# Patient Record
Sex: FEMALE | Race: WHITE | NOT HISPANIC OR LATINO | Employment: UNEMPLOYED | URBAN - METROPOLITAN AREA
[De-identification: names, ages, dates, MRNs, and addresses within clinical notes are randomized per-mention and may not be internally consistent; named-entity substitution may affect disease eponyms.]

---

## 2017-05-10 ENCOUNTER — ALLSCRIPTS OFFICE VISIT (OUTPATIENT)
Dept: OTHER | Facility: OTHER | Age: 62
End: 2017-05-10

## 2017-05-10 DIAGNOSIS — M54.2 CERVICALGIA: ICD-10-CM

## 2017-10-02 ENCOUNTER — ALLSCRIPTS OFFICE VISIT (OUTPATIENT)
Dept: OTHER | Facility: OTHER | Age: 62
End: 2017-10-02

## 2017-10-02 ENCOUNTER — APPOINTMENT (OUTPATIENT)
Dept: RADIOLOGY | Facility: MEDICAL CENTER | Age: 62
End: 2017-10-02
Payer: COMMERCIAL

## 2017-10-02 ENCOUNTER — TRANSCRIBE ORDERS (OUTPATIENT)
Dept: ADMINISTRATIVE | Facility: HOSPITAL | Age: 62
End: 2017-10-02

## 2017-10-02 DIAGNOSIS — M25.562 PAIN IN LEFT KNEE: ICD-10-CM

## 2017-10-02 PROCEDURE — 73562 X-RAY EXAM OF KNEE 3: CPT
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
## 2017-12-20 ENCOUNTER — GENERIC CONVERSION - ENCOUNTER (OUTPATIENT)
Dept: FAMILY MEDICINE CLINIC | Facility: CLINIC | Age: 62
End: 2017-12-20

## 2018-01-12 NOTE — RESULT NOTES
Message   Call patient  Patient's laboratory studies normal except elevated cholesterol  Recommend low-fat low-cholesterol diet and recheck lipid profile in 6 months     Verified Results  (1) COMPREHENSIVE METABOLIC PANEL 15VZQ6068 62:05AF Angel Roche     Test Name Result Flag Reference   GLUCOSE 90 mg/dL  65-99   Fasting reference interval   UREA NITROGEN (BUN) 20 mg/dL  7-25   CREATININE 0 98 mg/dL  0 50-0 99   For patients >52years of age, the reference limit  for Creatinine is approximately 13% higher for people  identified as -American  eGFR NON-AFR   AMERICAN 61 mL/min/1 73m2  > OR = 60   eGFR AFRICAN AMERICAN 71 mL/min/1 73m2  > OR = 60   BUN/CREATININE RATIO   3-14   NOT APPLICABLE (calc)   SODIUM 140 mmol/L  135-146   POTASSIUM 4 5 mmol/L  3 5-5 3   CHLORIDE 104 mmol/L     CARBON DIOXIDE 28 mmol/L  20-31   CALCIUM 9 4 mg/dL  8 6-10 4   PROTEIN, TOTAL 6 4 g/dL  6 1-8 1   ALBUMIN 4 1 g/dL  3 6-5 1   GLOBULIN 2 3 g/dL (calc)  1 9-3 7   ALBUMIN/GLOBULIN RATIO 1 8 (calc)  1 0-2 5   BILIRUBIN, TOTAL 0 6 mg/dL  0 2-1 2   ALKALINE PHOSPHATASE 94 U/L     AST 19 U/L  10-35   ALT 23 U/L  6-29     (1) CBC/PLT/DIFF 07Oct2016 08:27AM Angel Roche     Test Name Result Flag Reference   WHITE BLOOD CELL COUNT 6 4 Thousand/uL  3 8-10 8   RED BLOOD CELL COUNT 4 79 Million/uL  3 80-5 10   HEMOGLOBIN 14 3 g/dL  11 7-15 5   HEMATOCRIT 44 1 %  35 0-45 0   MCV 92 1 fL  80 0-100 0   MCH 29 9 pg  27 0-33 0   MCHC 32 5 g/dL  32 0-36 0   RDW 12 6 %  11 0-15 0   PLATELET COUNT 834 Thousand/uL  140-400   MPV 9 0 fL  7 5-11 5   ABSOLUTE NEUTROPHILS 4243 cells/uL  8565-0258   ABSOLUTE LYMPHOCYTES 1683 cells/uL  850-3900   ABSOLUTE MONOCYTES 314 cells/uL  200-950   ABSOLUTE EOSINOPHILS 141 cells/uL     ABSOLUTE BASOPHILS 19 cells/uL  0-200   NEUTROPHILS 66 3 %     LYMPHOCYTES 26 3 %     MONOCYTES 4 9 %     EOSINOPHILS 2 2 %     BASOPHILS 0 3 %       (Q) LIPID PANEL WITH REFLEX TO DIRECT LDL 07Oct2016 08: 27AM Frankey Dibble     Test Name Result Flag Reference   CHOLESTEROL, TOTAL 256 mg/dL H 125-200   HDL CHOLESTEROL 53 mg/dL  > OR = 46   TRIGLICERIDES 188 mg/dL  <150   LDL-CHOLESTEROL 180 mg/dL (calc) H <130   Desirable range <100 mg/dL for patients with CHD or  diabetes and <70 mg/dL for diabetic patients with  known heart disease  CHOL/HDLC RATIO 4 8 (calc)  < OR = 5 0   NON HDL CHOLESTEROL 203 mg/dL (calc) H    Target for non-HDL cholesterol is 30 mg/dL higher than   LDL cholesterol target       (Q) TSH, 3RD GENERATION W/REFLEX TO FT4 07Oct2016 08:27AM Frankey Dibble     Test Name Result Flag Reference   TSH W/REFLEX TO FT4 1 84 mIU/L  0 40-4 50

## 2018-01-14 VITALS
WEIGHT: 188.13 LBS | TEMPERATURE: 99.4 F | DIASTOLIC BLOOD PRESSURE: 80 MMHG | SYSTOLIC BLOOD PRESSURE: 130 MMHG | BODY MASS INDEX: 30.23 KG/M2 | HEIGHT: 66 IN

## 2018-01-15 VITALS
SYSTOLIC BLOOD PRESSURE: 120 MMHG | WEIGHT: 192 LBS | HEIGHT: 66 IN | BODY MASS INDEX: 30.86 KG/M2 | TEMPERATURE: 97.8 F | DIASTOLIC BLOOD PRESSURE: 80 MMHG

## 2018-11-13 ENCOUNTER — OFFICE VISIT (OUTPATIENT)
Dept: FAMILY MEDICINE CLINIC | Facility: CLINIC | Age: 63
End: 2018-11-13
Payer: COMMERCIAL

## 2018-11-13 VITALS
DIASTOLIC BLOOD PRESSURE: 82 MMHG | WEIGHT: 187 LBS | BODY MASS INDEX: 29.35 KG/M2 | SYSTOLIC BLOOD PRESSURE: 112 MMHG | HEIGHT: 67 IN

## 2018-11-13 DIAGNOSIS — E78.00 HYPERCHOLESTEROLEMIA: Primary | ICD-10-CM

## 2018-11-13 DIAGNOSIS — R73.01 IMPAIRED FASTING GLUCOSE: ICD-10-CM

## 2018-11-13 DIAGNOSIS — K21.9 GERD WITHOUT ESOPHAGITIS: ICD-10-CM

## 2018-11-13 DIAGNOSIS — L30.9 DERMATITIS: ICD-10-CM

## 2018-11-13 DIAGNOSIS — Z12.39 SCREENING FOR BREAST CANCER: ICD-10-CM

## 2018-11-13 DIAGNOSIS — Z12.11 SCREENING FOR COLON CANCER: ICD-10-CM

## 2018-11-13 DIAGNOSIS — R53.82 CHRONIC FATIGUE: ICD-10-CM

## 2018-11-13 PROCEDURE — 1101F PT FALLS ASSESS-DOCD LE1/YR: CPT | Performed by: FAMILY MEDICINE

## 2018-11-13 PROCEDURE — 90670 PCV13 VACCINE IM: CPT

## 2018-11-13 PROCEDURE — 3008F BODY MASS INDEX DOCD: CPT | Performed by: FAMILY MEDICINE

## 2018-11-13 PROCEDURE — 90471 IMMUNIZATION ADMIN: CPT

## 2018-11-13 PROCEDURE — 99214 OFFICE O/P EST MOD 30 MIN: CPT | Performed by: FAMILY MEDICINE

## 2018-11-13 RX ORDER — OMEPRAZOLE 20 MG/1
20 CAPSULE, DELAYED RELEASE ORAL DAILY
Qty: 90 CAPSULE | Refills: 1 | Status: SHIPPED | OUTPATIENT
Start: 2018-11-13 | End: 2019-03-27 | Stop reason: SDUPTHER

## 2018-11-13 NOTE — PROGRESS NOTES
Assessment/Plan:  Patient use betamethasone twice daily for dermatitis  Patient will modify diet regarding impaired fasting glucose  Patient use omeprazole for GERD  Patient will have laboratory studies regarding chronic fatigue  The patient will have mammogram   The patient already had flu shot for the year  The patient will have Prevnar 13 at this time  To consider statin or other lipid-lowering product in the near future  Will await laboratory studies  Follow-up in 4 months     Diagnoses and all orders for this visit:    Hypercholesterolemia  -     CBC and differential; Future  -     Comprehensive metabolic panel; Future  -     Hemoglobin A1C; Future  -     Lipid panel; Future  -     TSH, 3rd generation with Free T4 reflex; Future  -     Microalbumin / creatinine urine ratio  -     Vitamin B12; Future  -     C-reactive protein; Future  -     Cortisol Level, AM Specimen; Future  -     Lyme Antibody Profile with reflex to WB; Future  -     UA w Reflex to Microscopic w Reflex to Culture - Clinic Collect  -     Vitamin D 25 hydroxy; Future  -     Hepatitis C antibody; Future    Screening for colon cancer    Screening for breast cancer  -     Mammo screening bilateral w cad; Future  -     Mammo screening bilateral w 3d & cad; Future    Impaired fasting glucose  -     CBC and differential; Future  -     Comprehensive metabolic panel; Future  -     Hemoglobin A1C; Future  -     Lipid panel; Future  -     TSH, 3rd generation with Free T4 reflex; Future  -     Microalbumin / creatinine urine ratio  -     Vitamin B12; Future  -     C-reactive protein; Future  -     Cortisol Level, AM Specimen; Future  -     Lyme Antibody Profile with reflex to WB; Future  -     UA w Reflex to Microscopic w Reflex to Culture - Clinic Collect  -     Vitamin D 25 hydroxy; Future  -     Hepatitis C antibody;  Future  -     PNEUMOCOCCAL CONJUGATE VACCINE 13-VALENT GREATER THAN 6 MONTHS    Chronic fatigue  -     CBC and differential; Future  -     Comprehensive metabolic panel; Future  -     Hemoglobin A1C; Future  -     Lipid panel; Future  -     TSH, 3rd generation with Free T4 reflex; Future  -     Microalbumin / creatinine urine ratio  -     Vitamin B12; Future  -     C-reactive protein; Future  -     Cortisol Level, AM Specimen; Future  -     Lyme Antibody Profile with reflex to WB; Future  -     UA w Reflex to Microscopic w Reflex to Culture - Clinic Collect  -     Vitamin D 25 hydroxy; Future  -     Hepatitis C antibody; Future    GERD without esophagitis  -     omeprazole (PriLOSEC) 20 mg delayed release capsule; Take 1 capsule (20 mg total) by mouth daily    Dermatitis  -     betamethasone valerate (VALISONE) 0 1 % cream; Apply topically 2 (two) times a day    Other orders  -     Cancel: Ambulatory referral to Gastroenterology; Future  -     Cancel: Occult Blood, Fecal Immunochemical; Future  -     S-Adenosylmethionine (RODNEY-E) 200 MG TABS; Take by mouth  -     Multiple Vitamins-Minerals (MULTIVITAMIN GUMMIES ADULT PO); Take by mouth          Subjective:      Patient ID: Lynne Garcia is a 70 y o  female  Patient is here for multiple reasons 1 including fatigue over the past 4-5 months  Patient does exercise on a daily basis  Patient did go and have life screening test done  The patient states all screening normal other than cholesterol being elevated which was total of 328 with an HDL of 50 and an LDL of 254 triglycerides 119  Patient's glucose level was 101  Dietary intake roughly unchanged  Patient with family history of elevated cholesterol  Patient has tried multiple statins in the past without significant improvement and had side effects  Patient with ongoing acid reflux/GERD symptoms  Patient does use Prilosec  Patient with rash on the neck and arms  Patient did see Dermatology  Patient also needs screening mammo  Fatigue   Associated symptoms include fatigue and a rash     Arm Pain      Rash   Associated symptoms include fatigue  The following portions of the patient's history were reviewed and updated as appropriate: allergies, current medications, past family history, past medical history, past social history, past surgical history and problem list     Review of Systems   Constitutional: Positive for fatigue  HENT: Negative  Eyes: Negative  Respiratory: Negative  Cardiovascular: Negative  Gastrointestinal: Negative  Endocrine: Negative  Genitourinary: Negative  Musculoskeletal: Negative  Skin: Positive for rash  Allergic/Immunologic: Negative  Neurological: Negative  Hematological: Negative  Psychiatric/Behavioral: Negative  Objective:      /82 (BP Location: Right arm, Patient Position: Sitting, Cuff Size: Large)   Ht 5' 7" (1 702 m)   Wt 84 8 kg (187 lb)   BMI 29 29 kg/m²          Physical Exam   Constitutional: She is oriented to person, place, and time  She appears well-developed and well-nourished  No distress  HENT:   Head: Normocephalic  Right Ear: External ear normal    Left Ear: External ear normal    Mouth/Throat: Oropharynx is clear and moist  No oropharyngeal exudate  Eyes: Pupils are equal, round, and reactive to light  EOM are normal  Right eye exhibits no discharge  Left eye exhibits no discharge  No scleral icterus  Neck: Normal range of motion  Neck supple  No thyromegaly present  Cardiovascular: Normal rate, regular rhythm, normal heart sounds and intact distal pulses  Exam reveals no gallop and no friction rub  No murmur heard  Pulmonary/Chest: Effort normal and breath sounds normal  No respiratory distress  She has no wheezes  She has no rales  She exhibits no tenderness  Abdominal: Soft  Bowel sounds are normal  She exhibits no distension  There is no tenderness  There is no rebound and no guarding  Musculoskeletal: Normal range of motion  She exhibits no edema or tenderness     Lymphadenopathy:     She has no cervical adenopathy  Neurological: She is oriented to person, place, and time  No cranial nerve deficit  She exhibits normal muscle tone  Coordination normal    Skin: Skin is warm and dry  Rash noted  She is not diaphoretic  No erythema  No pallor  Erythematous rash on her neck   Psychiatric: She has a normal mood and affect  Her behavior is normal  Judgment and thought content normal    Nursing note and vitals reviewed

## 2018-11-15 ENCOUNTER — APPOINTMENT (OUTPATIENT)
Dept: LAB | Age: 63
End: 2018-11-15
Payer: COMMERCIAL

## 2018-11-15 DIAGNOSIS — R53.82 CHRONIC FATIGUE: ICD-10-CM

## 2018-11-15 DIAGNOSIS — R73.01 IMPAIRED FASTING GLUCOSE: ICD-10-CM

## 2018-11-15 DIAGNOSIS — E78.00 HYPERCHOLESTEROLEMIA: ICD-10-CM

## 2018-11-15 LAB
25(OH)D3 SERPL-MCNC: 41.7 NG/ML (ref 30–100)
ALBUMIN SERPL BCP-MCNC: 3.7 G/DL (ref 3.5–5)
ALP SERPL-CCNC: 108 U/L (ref 46–116)
ALT SERPL W P-5'-P-CCNC: 26 U/L (ref 12–78)
ANION GAP SERPL CALCULATED.3IONS-SCNC: 2 MMOL/L (ref 4–13)
AST SERPL W P-5'-P-CCNC: 17 U/L (ref 5–45)
BACTERIA UR QL AUTO: ABNORMAL /HPF
BASOPHILS # BLD AUTO: 0.05 THOUSANDS/ΜL (ref 0–0.1)
BASOPHILS NFR BLD AUTO: 1 % (ref 0–1)
BILIRUB SERPL-MCNC: 0.69 MG/DL (ref 0.2–1)
BILIRUB UR QL STRIP: NEGATIVE
BUN SERPL-MCNC: 19 MG/DL (ref 5–25)
CALCIUM SERPL-MCNC: 8.6 MG/DL (ref 8.3–10.1)
CHLORIDE SERPL-SCNC: 107 MMOL/L (ref 100–108)
CHOLEST SERPL-MCNC: 268 MG/DL (ref 50–200)
CLARITY UR: CLEAR
CO2 SERPL-SCNC: 29 MMOL/L (ref 21–32)
COLOR UR: YELLOW
CORTIS AM PEAK SERPL-MCNC: 12.6 UG/DL (ref 4.2–22.4)
CREAT SERPL-MCNC: 1.04 MG/DL (ref 0.6–1.3)
CREAT UR-MCNC: 134 MG/DL
CRP SERPL QL: <3 MG/L
EOSINOPHIL # BLD AUTO: 0.12 THOUSAND/ΜL (ref 0–0.61)
EOSINOPHIL NFR BLD AUTO: 2 % (ref 0–6)
ERYTHROCYTE [DISTWIDTH] IN BLOOD BY AUTOMATED COUNT: 12.4 % (ref 11.6–15.1)
EST. AVERAGE GLUCOSE BLD GHB EST-MCNC: 120 MG/DL
GFR SERPL CREATININE-BSD FRML MDRD: 57 ML/MIN/1.73SQ M
GLUCOSE P FAST SERPL-MCNC: 92 MG/DL (ref 65–99)
GLUCOSE UR STRIP-MCNC: NEGATIVE MG/DL
HBA1C MFR BLD: 5.8 % (ref 4.2–6.3)
HCT VFR BLD AUTO: 44.2 % (ref 34.8–46.1)
HCV AB SER QL: NORMAL
HDLC SERPL-MCNC: 51 MG/DL (ref 40–60)
HGB BLD-MCNC: 14.8 G/DL (ref 11.5–15.4)
HGB UR QL STRIP.AUTO: NEGATIVE
HYALINE CASTS #/AREA URNS LPF: ABNORMAL /LPF
IMM GRANULOCYTES # BLD AUTO: 0.01 THOUSAND/UL (ref 0–0.2)
IMM GRANULOCYTES NFR BLD AUTO: 0 % (ref 0–2)
KETONES UR STRIP-MCNC: NEGATIVE MG/DL
LDLC SERPL CALC-MCNC: 198 MG/DL (ref 0–100)
LEUKOCYTE ESTERASE UR QL STRIP: ABNORMAL
LYMPHOCYTES # BLD AUTO: 1.77 THOUSANDS/ΜL (ref 0.6–4.47)
LYMPHOCYTES NFR BLD AUTO: 29 % (ref 14–44)
MCH RBC QN AUTO: 30.5 PG (ref 26.8–34.3)
MCHC RBC AUTO-ENTMCNC: 33.5 G/DL (ref 31.4–37.4)
MCV RBC AUTO: 91 FL (ref 82–98)
MICROALBUMIN UR-MCNC: 7 MG/L (ref 0–20)
MICROALBUMIN/CREAT 24H UR: 5 MG/G CREATININE (ref 0–30)
MONOCYTES # BLD AUTO: 0.38 THOUSAND/ΜL (ref 0.17–1.22)
MONOCYTES NFR BLD AUTO: 6 % (ref 4–12)
NEUTROPHILS # BLD AUTO: 3.75 THOUSANDS/ΜL (ref 1.85–7.62)
NEUTS SEG NFR BLD AUTO: 62 % (ref 43–75)
NITRITE UR QL STRIP: NEGATIVE
NON-SQ EPI CELLS URNS QL MICRO: ABNORMAL /HPF
NONHDLC SERPL-MCNC: 217 MG/DL
NRBC BLD AUTO-RTO: 0 /100 WBCS
PH UR STRIP.AUTO: 6.5 [PH] (ref 4.5–8)
PLATELET # BLD AUTO: 236 THOUSANDS/UL (ref 149–390)
PMV BLD AUTO: 10.2 FL (ref 8.9–12.7)
POTASSIUM SERPL-SCNC: 4.3 MMOL/L (ref 3.5–5.3)
PROT SERPL-MCNC: 7.1 G/DL (ref 6.4–8.2)
PROT UR STRIP-MCNC: NEGATIVE MG/DL
RBC # BLD AUTO: 4.86 MILLION/UL (ref 3.81–5.12)
RBC #/AREA URNS AUTO: ABNORMAL /HPF
SODIUM SERPL-SCNC: 138 MMOL/L (ref 136–145)
SP GR UR STRIP.AUTO: 1.02 (ref 1–1.03)
TRIGL SERPL-MCNC: 94 MG/DL
TSH SERPL DL<=0.05 MIU/L-ACNC: 1.66 UIU/ML (ref 0.36–3.74)
UROBILINOGEN UR QL STRIP.AUTO: 0.2 E.U./DL
VIT B12 SERPL-MCNC: 894 PG/ML (ref 100–900)
WBC # BLD AUTO: 6.08 THOUSAND/UL (ref 4.31–10.16)
WBC #/AREA URNS AUTO: ABNORMAL /HPF

## 2018-11-15 PROCEDURE — 82043 UR ALBUMIN QUANTITATIVE: CPT | Performed by: FAMILY MEDICINE

## 2018-11-15 PROCEDURE — 82570 ASSAY OF URINE CREATININE: CPT | Performed by: FAMILY MEDICINE

## 2018-11-15 PROCEDURE — 80053 COMPREHEN METABOLIC PANEL: CPT

## 2018-11-15 PROCEDURE — 81001 URINALYSIS AUTO W/SCOPE: CPT | Performed by: FAMILY MEDICINE

## 2018-11-15 PROCEDURE — 82306 VITAMIN D 25 HYDROXY: CPT

## 2018-11-15 PROCEDURE — 85025 COMPLETE CBC W/AUTO DIFF WBC: CPT

## 2018-11-15 PROCEDURE — 83036 HEMOGLOBIN GLYCOSYLATED A1C: CPT

## 2018-11-15 PROCEDURE — 82607 VITAMIN B-12: CPT

## 2018-11-15 PROCEDURE — 80061 LIPID PANEL: CPT

## 2018-11-15 PROCEDURE — 36415 COLL VENOUS BLD VENIPUNCTURE: CPT

## 2018-11-15 PROCEDURE — 86140 C-REACTIVE PROTEIN: CPT

## 2018-11-15 PROCEDURE — 84443 ASSAY THYROID STIM HORMONE: CPT

## 2018-11-15 PROCEDURE — 86803 HEPATITIS C AB TEST: CPT

## 2018-11-15 PROCEDURE — 86618 LYME DISEASE ANTIBODY: CPT

## 2018-11-15 PROCEDURE — 82533 TOTAL CORTISOL: CPT

## 2018-11-16 DIAGNOSIS — E78.00 PURE HYPERCHOLESTEROLEMIA: Primary | ICD-10-CM

## 2018-11-16 NOTE — TELEPHONE ENCOUNTER
SPOKE TO PT   SHE HAS TRIED NUMEROUS CHOLESTEROL MEDS (CRESTOR, LIPITOR) DUE TO SIDE EFFECTS    SEND MED TO [facility]

## 2018-11-16 NOTE — TELEPHONE ENCOUNTER
Spoke with patient  She states she was already called and someone relayed the message to her already  She states she knew someone was going to send in the medication for her; however, I can not find who called her  Please approve the prepped script for the patient  Thank you

## 2018-11-16 NOTE — TELEPHONE ENCOUNTER
Call patient  Have patient try Livalo 2 mg daily Number 30 with 2 refills  Have patient take coenzyme Q10 200 mg daily over-the-counter  Tell patient to stop medication if severe joint pains or muscle pains start such as with previous statin use    Recheck CMP, lipid profile in 2 months if able to take medication

## 2018-11-16 NOTE — TELEPHONE ENCOUNTER
----- Message from Mahin Aguilar DO sent at 11/15/2018 12:19 PM EST -----  Call patient  Most of her blood work is normal other than elevated cholesterol with total cholesterol 268 and   Some of her labs are still pending  Recommend low-fat low-cholesterol diet    See if patient would be willing to consider statin medication

## 2018-11-17 LAB
B BURGDOR IGG SER IA-ACNC: 0.33
B BURGDOR IGM SER IA-ACNC: 0.16

## 2018-11-21 ENCOUNTER — TELEPHONE (OUTPATIENT)
Dept: FAMILY MEDICINE CLINIC | Facility: CLINIC | Age: 63
End: 2018-11-21

## 2018-11-21 NOTE — TELEPHONE ENCOUNTER
I left a message notifying the pharmacy of the medication approval for Livalo  It's valid from 9/21-18 - 12/31/2099

## 2019-03-27 ENCOUNTER — OFFICE VISIT (OUTPATIENT)
Dept: FAMILY MEDICINE CLINIC | Facility: CLINIC | Age: 64
End: 2019-03-27
Payer: COMMERCIAL

## 2019-03-27 VITALS
SYSTOLIC BLOOD PRESSURE: 118 MMHG | DIASTOLIC BLOOD PRESSURE: 72 MMHG | BODY MASS INDEX: 29.03 KG/M2 | TEMPERATURE: 98.1 F | HEIGHT: 67 IN | WEIGHT: 185 LBS

## 2019-03-27 DIAGNOSIS — E78.00 PURE HYPERCHOLESTEROLEMIA: ICD-10-CM

## 2019-03-27 DIAGNOSIS — Z00.00 WELL ADULT EXAM: Primary | ICD-10-CM

## 2019-03-27 DIAGNOSIS — K21.9 GERD WITHOUT ESOPHAGITIS: ICD-10-CM

## 2019-03-27 PROCEDURE — 1160F RVW MEDS BY RX/DR IN RCRD: CPT | Performed by: FAMILY MEDICINE

## 2019-03-27 PROCEDURE — 99397 PER PM REEVAL EST PAT 65+ YR: CPT | Performed by: FAMILY MEDICINE

## 2019-03-27 RX ORDER — OMEPRAZOLE 20 MG/1
20 CAPSULE, DELAYED RELEASE ORAL DAILY
Qty: 90 CAPSULE | Refills: 1 | Status: SHIPPED | OUTPATIENT
Start: 2019-03-27 | End: 2020-08-27 | Stop reason: SDUPTHER

## 2019-03-27 NOTE — PROGRESS NOTES
Assessment/Plan:  Patient will try to decrease and restart level low at 1 mg every 3 days and increased accordingly  To consider Zetia  Patient go for mammogram   Patient up-to-date with tetanus shot  Diagnoses and all orders for this visit:    Well adult exam  -     Mammo screening bilateral w cad; Future    Pure hypercholesterolemia  -     pitavastatin 1 MG TABS; Take 1 tablet (1 mg total) by mouth daily with dinner  -     Comprehensive metabolic panel; Future  -     Lipid panel; Future          Subjective:      Patient ID: Mariangel Rhoades is a 70 y o  female  Patient is here for wellness exam   Patient having a lot of GI issues associated with level 0  Patient stop medication roughly 3 weeks ago  Patient with some fatigue  No chest pain or shortness of breath  No headache or blurred vision  Patient does notice that the urinary stream is slightly slower  GI issues back to normal       The following portions of the patient's history were reviewed and updated as appropriate: allergies, current medications, past family history, past medical history, past social history, past surgical history and problem list     Review of Systems   Constitutional: Negative  HENT: Negative  Eyes: Negative  Respiratory: Negative  Cardiovascular: Negative  Gastrointestinal: Negative  Endocrine: Negative  Genitourinary: Negative  Musculoskeletal: Negative  Skin: Negative  Allergic/Immunologic: Negative  Neurological: Negative  Hematological: Negative  Psychiatric/Behavioral: Negative            Objective:      /72 (BP Location: Left arm)   Temp 98 1 °F (36 7 °C)   Ht 5' 7" (1 702 m)   Wt 83 9 kg (185 lb)   BMI 28 98 kg/m²          Physical Exam

## 2020-03-19 ENCOUNTER — TELEPHONE (OUTPATIENT)
Dept: FAMILY MEDICINE CLINIC | Facility: CLINIC | Age: 65
End: 2020-03-19

## 2020-03-19 PROCEDURE — G2012 BRIEF CHECK IN BY MD/QHP: HCPCS

## 2020-03-19 NOTE — TELEPHONE ENCOUNTER
COVID-19 Phone Call Triage    Humphrey Pacheco called stating that they are having Cough, Fatigue, Body Aches and Chills  Humphrey Pacheco has not traveled outside the [place]  within the last 14 days  Bhavna Espino has not been around any one ill or exposed to COVID-19  Please call patient to triage

## 2020-03-19 NOTE — TELEPHONE ENCOUNTER
COVID-19 Virtual Visit     This virtual check-in was done via telephone  Encounter provider Seth Wise    Provider located at [address] [address] [id number]    Recent Visits  No visits were found meeting these conditions  Showing recent visits within past 7 days and meeting all other requirements     Today's Visits  Date Type Provider Dept   03/19/20 Telephone 120[facility] today's visits and meeting all other requirements     Future Appointments  No visits were found meeting these conditions  Showing future appointments within next 150 days and meeting all other requirements        Patient agrees to participate in a virtual check in via telephone or video visit instead of presenting to the office to address urgent/immediate medical needs  Patient is aware this is a billable service  After connecting through telephone, the patient was identified by name and date of birth  Janes Perez was informed that this was a telemedicine visit and that the exam was being conducted confidentially over secure lines  My office door was closed  No one else was in the room  Janes Perez acknowledged consent and understanding of privacy and security of the telemedicine visit  I informed the patient that I have reviewed her record in Epic and presented the opportunity for her to ask any questions regarding the visit today  The patient agreed to participate  Janes Perez is a 75 y o  female who is concerned about COVID-19  She reports congestion sinus pressure and chills with postnasal drip  No fevers chills nausea vomiting or significant cough  She has not traveled outside the [place]  within the last 14 days    She has not had contact with a person who is under investigation for or who is positive for COVID-19 within the last 14 days   She has not been hospitalized recently for fever and/or lower respiratory symptoms  Past Medical History:   Diagnosis Date    Bell's palsy        Past Surgical History:   Procedure Laterality Date    LIPOSUCTION      Thighs       Current Outpatient Medications   Medication Sig Dispense Refill    betamethasone valerate (VALISONE) 0 1 % cream Apply topically 2 (two) times a day 30 g 0    Multiple Vitamins-Minerals (MULTIVITAMIN GUMMIES ADULT PO) Take by mouth      omeprazole (PriLOSEC) 20 mg delayed release capsule Take 1 capsule (20 mg total) by mouth daily 90 capsule 1    Pitavastatin Calcium 1 MG TABS Take 1 tablet (1 mg total) by mouth daily with dinner 90 tablet 1    S-Adenosylmethionine (RODNEY-E) 200 MG TABS Take by mouth       No current facility-administered medications for this visit  Allergies   Allergen Reactions    Bee Venom     Codeine Nausea Only and Vomiting    Other      Action Taken: SHRIMP  ANESTHESIA "DEATHLY ILL"  NARCOTICS;     Shrimp Extract Allergy Skin Test     Statins         Disposition:      After clarifying the patient's history, my suspicion for COVID-19 infection is very low  Patient has a script for amoxicillin which she plans to take and encouraged her to begin Flonase as well and call back if not better or worsened    I spent 5 minutes with the patient during this virtual check-in visit

## 2020-05-05 PROBLEM — J01.00 ACUTE NON-RECURRENT MAXILLARY SINUSITIS: Status: ACTIVE | Noted: 2020-05-05

## 2020-05-05 PROBLEM — H10.32 ACUTE BACTERIAL CONJUNCTIVITIS OF LEFT EYE: Status: ACTIVE | Noted: 2020-05-05

## 2020-08-27 ENCOUNTER — OFFICE VISIT (OUTPATIENT)
Dept: FAMILY MEDICINE CLINIC | Facility: CLINIC | Age: 65
End: 2020-08-27
Payer: MEDICARE

## 2020-08-27 VITALS
TEMPERATURE: 97.6 F | DIASTOLIC BLOOD PRESSURE: 68 MMHG | BODY MASS INDEX: 30.45 KG/M2 | SYSTOLIC BLOOD PRESSURE: 122 MMHG | WEIGHT: 194.4 LBS

## 2020-08-27 DIAGNOSIS — R73.01 IMPAIRED FASTING GLUCOSE: Primary | ICD-10-CM

## 2020-08-27 DIAGNOSIS — Z00.00 MEDICARE ANNUAL WELLNESS VISIT, SUBSEQUENT: ICD-10-CM

## 2020-08-27 DIAGNOSIS — Z13.6 SCREENING FOR CARDIOVASCULAR CONDITION: ICD-10-CM

## 2020-08-27 DIAGNOSIS — Z23 ENCOUNTER FOR IMMUNIZATION: ICD-10-CM

## 2020-08-27 DIAGNOSIS — Z13.1 SCREENING FOR DIABETES MELLITUS: ICD-10-CM

## 2020-08-27 DIAGNOSIS — K21.9 GERD WITHOUT ESOPHAGITIS: ICD-10-CM

## 2020-08-27 DIAGNOSIS — H10.32 ACUTE BACTERIAL CONJUNCTIVITIS OF LEFT EYE: ICD-10-CM

## 2020-08-27 DIAGNOSIS — Z12.31 ENCOUNTER FOR SCREENING MAMMOGRAM FOR BREAST CANCER: ICD-10-CM

## 2020-08-27 PROCEDURE — 90732 PPSV23 VACC 2 YRS+ SUBQ/IM: CPT | Performed by: FAMILY MEDICINE

## 2020-08-27 PROCEDURE — 1160F RVW MEDS BY RX/DR IN RCRD: CPT | Performed by: FAMILY MEDICINE

## 2020-08-27 PROCEDURE — G0009 ADMIN PNEUMOCOCCAL VACCINE: HCPCS | Performed by: FAMILY MEDICINE

## 2020-08-27 PROCEDURE — 1125F AMNT PAIN NOTED PAIN PRSNT: CPT | Performed by: FAMILY MEDICINE

## 2020-08-27 PROCEDURE — 1036F TOBACCO NON-USER: CPT | Performed by: FAMILY MEDICINE

## 2020-08-27 PROCEDURE — G0402 INITIAL PREVENTIVE EXAM: HCPCS | Performed by: FAMILY MEDICINE

## 2020-08-27 PROCEDURE — 4040F PNEUMOC VAC/ADMIN/RCVD: CPT | Performed by: FAMILY MEDICINE

## 2020-08-27 PROCEDURE — 1170F FXNL STATUS ASSESSED: CPT | Performed by: FAMILY MEDICINE

## 2020-08-27 RX ORDER — ASPIRIN 81 MG/1
81 TABLET, CHEWABLE ORAL
COMMUNITY

## 2020-08-27 RX ORDER — OMEPRAZOLE 20 MG/1
20 CAPSULE, DELAYED RELEASE ORAL DAILY
Qty: 90 CAPSULE | Refills: 1 | Status: SHIPPED | OUTPATIENT
Start: 2020-08-27 | End: 2021-01-06 | Stop reason: SDUPTHER

## 2020-08-27 RX ORDER — OFLOXACIN 3 MG/ML
1 SOLUTION/ DROPS OPHTHALMIC 3 TIMES DAILY
Qty: 5 ML | Refills: 0 | Status: SHIPPED | OUTPATIENT
Start: 2020-08-27 | End: 2021-10-26

## 2020-08-27 NOTE — PATIENT INSTRUCTIONS
Medicare Preventive Visit Patient Instructions  Thank you for completing your Welcome to Medicare Visit or Medicare Annual Wellness Visit today  Your next wellness visit will be due in one year (8/27/2021)  The screening/preventive services that you may require over the next 5-10 years are detailed below  Some tests may not apply to you based off risk factors and/or age  Screening tests ordered at today's visit but not completed yet may show as past due  Also, please note that scanned in results may not display below  Preventive Screenings:  Service Recommendations Previous Testing/Comments   Colorectal Cancer Screening  * Colonoscopy    * Fecal Occult Blood Test (FOBT)/Fecal Immunochemical Test (FIT)  * Fecal DNA/Cologuard Test  * Flexible Sigmoidoscopy Age: 54-65 years old   Colonoscopy: every 10 years (may be performed more frequently if at higher risk)  OR  FOBT/FIT: every 1 year  OR  Cologuard: every 3 years  OR  Sigmoidoscopy: every 5 years  Screening may be recommended earlier than age 48 if at higher risk for colorectal cancer  Also, an individualized decision between you and your healthcare provider will decide whether screening between the ages of 74-80 would be appropriate  Colonoscopy: Not on file  FOBT/FIT: Not on file  Cologuard: Not on file  Sigmoidoscopy: Not on file    Risks and Benefits Discussed  Screening Current     Breast Cancer Screening Age: 36 years old  Frequency: every 1-2 years  Not required if history of left and right mastectomy Mammogram: Not on file    Risks and Benefits Discussed  Screening Current   Cervical Cancer Screening Between the ages of 21-29, pap smear recommended once every 3 years  Between the ages of 33-67, can perform pap smear with HPV co-testing every 5 years     Recommendations may differ for women with a history of total hysterectomy, cervical cancer, or abnormal pap smears in past  Pap Smear: Not on file    Screening Not Indicated  Risks and Benefits Discussed   Hepatitis C Screening Once for adults born between 1945 and 1965  More frequently in patients at high risk for Hepatitis C Hep C Antibody: 11/15/2018    Screening Current  Risks and Benefits Discussed   Diabetes Screening 1-2 times per year if you're at risk for diabetes or have pre-diabetes Fasting glucose: 92 mg/dL   A1C: 5 8 %    Risks and Benefits Discussed  Screening Current   Cholesterol Screening Once every 5 years if you don't have a lipid disorder  May order more often based on risk factors  Lipid panel: 11/15/2018    Screening Not Indicated  History Lipid Disorder  Screening Current  Risks and Benefits Discussed     Other Preventive Screenings Covered by Medicare:  1  Abdominal Aortic Aneurysm (AAA) Screening: covered once if your at risk  You're considered to be at risk if you have a family history of AAA  2  Lung Cancer Screening: covers low dose CT scan once per year if you meet all of the following conditions: (1) Age 50-69; (2) No signs or symptoms of lung cancer; (3) Current smoker or have quit smoking within the last 15 years; (4) You have a tobacco smoking history of at least 30 pack years (packs per day multiplied by number of years you smoked); (5) You get a written order from a healthcare provider  3  Glaucoma Screening: covered annually if you're considered high risk: (1) You have diabetes OR (2) Family history of glaucoma OR (3)  aged 48 and older OR (3)  American aged 72 and older  3  Osteoporosis Screening: covered every 2 years if you meet one of the following conditions: (1) You're estrogen deficient and at risk for osteoporosis based off medical history and other findings; (2) Have a vertebral abnormality; (3) On glucocorticoid therapy for more than 3 months; (4) Have primary hyperparathyroidism; (5) On osteoporosis medications and need to assess response to drug therapy  · Last bone density test (DXA Scan): Not on file    5  HIV Screening: covered annually if you're between the age of 12-76  Also covered annually if you are younger than 13 and older than 72 with risk factors for HIV infection  For pregnant patients, it is covered up to 3 times per pregnancy  Immunizations:  Immunization Recommendations   Influenza Vaccine Annual influenza vaccination during flu season is recommended for all persons aged >= 6 months who do not have contraindications   Pneumococcal Vaccine (Prevnar and Pneumovax)  * Prevnar = PCV13  * Pneumovax = PPSV23   Adults 25-60 years old: 1-3 doses may be recommended based on certain risk factors  Adults 72 years old: Prevnar (PCV13) vaccine recommended followed by Pneumovax (PPSV23) vaccine  If already received PPSV23 since turning 65, then PCV13 recommended at least one year after PPSV23 dose  Hepatitis B Vaccine 3 dose series if at intermediate or high risk (ex: diabetes, end stage renal disease, liver disease)   Tetanus (Td) Vaccine - COST NOT COVERED BY MEDICARE PART B Following completion of primary series, a booster dose should be given every 10 years to maintain immunity against tetanus  Td may also be given as tetanus wound prophylaxis  Tdap Vaccine - COST NOT COVERED BY MEDICARE PART B Recommended at least once for all adults  For pregnant patients, recommended with each pregnancy  Shingles Vaccine (Shingrix) - COST NOT COVERED BY MEDICARE PART B  2 shot series recommended in those aged 48 and above     Health Maintenance Due:      Topic Date Due    Hepatitis C Screening  Completed     Immunizations Due:      Topic Date Due    DTaP,Tdap,and Td Vaccines (1 - Tdap) 08/04/1974    Pneumococcal Vaccine: 65+ Years (2 of 2 - PPSV23) 11/13/2019    Influenza Vaccine  07/01/2020     Advance Directives   What are advance directives? Advance directives are legal documents that state your wishes and plans for medical care  These plans are made ahead of time in case you lose your ability to make decisions for yourself   Advance directives can apply to any medical decision, such as the treatments you want, and if you want to donate organs  What are the types of advance directives? There are many types of advance directives, and each state has rules about how to use them  You may choose a combination of any of the following:  · Living will: This is a written record of the treatment you want  You can also choose which treatments you do not want, which to limit, and which to stop at a certain time  This includes surgery, medicine, IV fluid, and tube feedings  · Durable power of  for healthcare [facility]): This is a written record that states who you want to make healthcare choices for you when you are unable to make them for yourself  This person, called a proxy, is usually a family member or a friend  You may choose more than 1 proxy  · Do not resuscitate (DNR) order:  A DNR order is used in case your heart stops beating or you stop breathing  It is a request not to have certain forms of treatment, such as CPR  A DNR order may be included in other types of advance directives  · Medical directive: This covers the care that you want if you are in a coma, near death, or unable to make decisions for yourself  You can list the treatments you want for each condition  Treatment may include pain medicine, surgery, blood transfusions, dialysis, IV or tube feedings, and a ventilator (breathing machine)  · Values history: This document has questions about your views, beliefs, and how you feel and think about life  This information can help others choose the care that you would choose  Why are advance directives important? An advance directive helps you control your care  Although spoken wishes may be used, it is better to have your wishes written down  Spoken wishes can be misunderstood, or not followed  Treatments may be given even if you do not want them   An advance directive may make it easier for your family to make difficult choices about your care  © Copyright [employer] 2018 Information is for End User's use only and may not be sold, redistributed or otherwise used for commercial purposes   All illustrations and images included in CareNotes® are the copyrighted property of A D A M , Inc  or [address] [place], [place] [address]adriane eleanor

## 2020-08-27 NOTE — PROGRESS NOTES
Assessment and Plan:     Problem List Items Addressed This Visit     None           Preventive health issues were discussed with patient, and age appropriate screening tests were ordered as noted in patient's After Visit Summary  Personalized health advice and appropriate referrals for health education or preventive services given if needed, as noted in patient's After Visit Summary  History of Present Illness:     Patient presents for Medicare Annual Wellness visit    Patient Care Team:  Bryant Dwyer DO as PCP - General     Problem List:     Patient Active Problem List   Diagnosis    Hypercholesterolemia    GERD without esophagitis    Impaired fasting glucose    Chronic fatigue    Dermatitis    Well adult exam    Acute non-recurrent maxillary sinusitis    Acute bacterial conjunctivitis of left eye      Past Medical and Surgical History:     Past Medical History:   Diagnosis Date    Bell's palsy      Past Surgical History:   Procedure Laterality Date    LIPOSUCTION      Thighs      Family History:     No family history on file  Social History:        Social History     Socioeconomic History    Marital status: /Civil Union     Spouse name: Not on file    Number of children: Not on file    Years of education: Not on file    Highest education level: Not on file   Occupational History    Not on file   Social Needs    Financial resource strain: Not on file    Food insecurity     Worry: Not on file     Inability: Not on file   Spokeable needs     Medical: Not on file     Non-medical: Not on file   Tobacco Use    Smoking status: Former Smoker    Smokeless tobacco: Never Used   Substance and Sexual Activity    Alcohol use:  Yes    Drug use: No    Sexual activity: Not on file   Lifestyle    Physical activity     Days per week: Not on file     Minutes per session: Not on file    Stress: Not on file   Relationships    Social connections     Talks on phone: Not on file     Gets together: Not on file     Attends Sabianism service: Not on file     Active member of club or organization: Not on file     Attends meetings of clubs or organizations: Not on file     Relationship status: Not on file    Intimate partner violence     Fear of current or ex partner: Not on file     Emotionally abused: Not on file     Physically abused: Not on file     Forced sexual activity: Not on file   Other Topics Concern    Not on file   Social History Narrative    Always uses helmet    Always uses seatbelt     Caffeine use     Exercises regularly     Guns in the home: stored in locked cabinet     Pt has living will      Medications and Allergies:     Current Outpatient Medications   Medication Sig Dispense Refill    betamethasone valerate (VALISONE) 0 1 % cream Apply topically 2 (two) times a day 30 g 0    Multiple Vitamins-Minerals (MULTIVITAMIN GUMMIES ADULT PO) Take by mouth      ofloxacin (OCUFLOX) 0 3 % ophthalmic solution Administer 1 drop into the left eye 3 (three) times a day 5 mL 0    omeprazole (PriLOSEC) 20 mg delayed release capsule Take 1 capsule (20 mg total) by mouth daily 90 capsule 1    Pitavastatin Calcium 1 MG TABS Take 1 tablet (1 mg total) by mouth daily with dinner 90 tablet 1    S-Adenosylmethionine (RODNEY-E) 200 MG TABS Take by mouth       No current facility-administered medications for this visit        Allergies   Allergen Reactions    Bee Venom     Codeine Nausea Only and Vomiting    Other      Action Taken: SHRIMP  ANESTHESIA "DEATHLY ILL"  NARCOTICS;     Shrimp Extract Allergy Skin Test     Statins       Immunizations:     Immunization History   Administered Date(s) Administered    INFLUENZA 12/03/2012, 11/19/2013, 10/15/2014, 11/21/2016, 11/02/2018    Influenza Quadrivalent Preservative Free 3 years and older IM 10/23/2017    Pneumococcal Conjugate 13-Valent 11/13/2018    influenza, trivalent, adjuvanted 11/04/2019      Health Maintenance:         Topic Date Due    Hepatitis C Screening  Completed         Topic Date Due    DTaP,Tdap,and Td Vaccines (1 - Tdap) 08/04/1974    Pneumococcal Vaccine: 65+ Years (2 of 2 - PPSV23) 11/13/2019    Influenza Vaccine  07/01/2020      Medicare Health Risk Assessment:     There were no vitals taken for this visit  Mihir Garcia is here for her Subsequent Wellness visit  Health Risk Assessment:   Patient rates overall health as very good  Patient feels that their physical health rating is slightly worse  Eyesight was rated as same  Hearing was rated as same  Patient feels that their emotional and mental health rating is same  Pain experienced in the last 7 days has been some  Patient's pain rating has been 1/10  Patient states that she has experienced weight loss or gain in last 6 months  Depression Screening:   PHQ-2 Score: 0      Fall Risk Screening: In the past year, patient has experienced: no history of falling in past year      Urinary Incontinence Screening:   Patient has not leaked urine accidently in the last six months  Home Safety:  Patient does not have trouble with stairs inside or outside of their home  Patient has working smoke alarms and has working carbon monoxide detector  Home safety hazards include: none  Nutrition:   Current diet is Regular  Medications:   Patient is currently taking over-the-counter supplements  OTC medications include: see medication list  Patient is able to manage medications  Activities of Daily Living (ADLs)/Instrumental Activities of Daily Living (IADLs):   Walk and transfer into and out of bed and chair?: Yes  Dress and groom yourself?: Yes    Bathe or shower yourself?: Yes    Feed yourself?  Yes  Do your laundry/housekeeping?: Yes  Manage your money, pay your bills and track your expenses?: Yes  Make your own meals?: Yes    Do your own shopping?: Yes    Previous Hospitalizations:   Any hospitalizations or ED visits within the last 12 months?: No      Advance Care Planning: Living will: Yes    Durable POA for healthcare: Yes    Advanced directive: Yes      Cognitive Screening:   Provider or family/friend/caregiver concerned regarding cognition?: No    PREVENTIVE SCREENINGS      Cardiovascular Screening:    General: Screening Not Indicated, History Lipid Disorder and Risks and Benefits Discussed    Due for: Lipid Panel      Diabetes Screening:     General: Risks and Benefits Discussed    Due for: Blood Glucose      Colorectal Cancer Screening:     General: Risks and Benefits Discussed and Screening Current      Breast Cancer Screening:     General: Risks and Benefits Discussed and Screening Current    Due for: Mammogram        Cervical Cancer Screening:    General: Screening Not Indicated and Risks and Benefits Discussed      Osteoporosis Screening:    General: Risks and Benefits Discussed      Abdominal Aortic Aneurysm (AAA) Screening:        General: Risks and Benefits Discussed      Lung Cancer Screening:     General: Screening Not Indicated and Risks and Benefits Discussed      Hepatitis C Screening:    General: Screening Current and Risks and Benefits Discussed    Other Counseling Topics:   Calcium and vitamin D intake and regular weightbearing exercise         Larisa Irby DO

## 2020-11-03 ENCOUNTER — LAB (OUTPATIENT)
Dept: LAB | Age: 65
End: 2020-11-03
Payer: MEDICARE

## 2020-11-03 DIAGNOSIS — Z13.6 SCREENING FOR CARDIOVASCULAR CONDITION: ICD-10-CM

## 2020-11-03 DIAGNOSIS — Z00.00 MEDICARE ANNUAL WELLNESS VISIT, SUBSEQUENT: ICD-10-CM

## 2020-11-03 DIAGNOSIS — Z13.1 SCREENING FOR DIABETES MELLITUS: ICD-10-CM

## 2020-11-03 DIAGNOSIS — R73.01 IMPAIRED FASTING GLUCOSE: ICD-10-CM

## 2020-11-03 LAB
ALBUMIN SERPL BCP-MCNC: 4.3 G/DL (ref 3.5–5)
ALP SERPL-CCNC: 111 U/L (ref 46–116)
ALT SERPL W P-5'-P-CCNC: 26 U/L (ref 12–78)
ANION GAP SERPL CALCULATED.3IONS-SCNC: 4 MMOL/L (ref 4–13)
AST SERPL W P-5'-P-CCNC: 13 U/L (ref 5–45)
BASOPHILS # BLD AUTO: 0.03 THOUSANDS/ΜL (ref 0–0.1)
BASOPHILS NFR BLD AUTO: 0 % (ref 0–1)
BILIRUB SERPL-MCNC: 0.57 MG/DL (ref 0.2–1)
BUN SERPL-MCNC: 13 MG/DL (ref 5–25)
CALCIUM SERPL-MCNC: 9 MG/DL (ref 8.3–10.1)
CHLORIDE SERPL-SCNC: 107 MMOL/L (ref 100–108)
CHOLEST SERPL-MCNC: 276 MG/DL (ref 50–200)
CO2 SERPL-SCNC: 31 MMOL/L (ref 21–32)
CREAT SERPL-MCNC: 1.01 MG/DL (ref 0.6–1.3)
EOSINOPHIL # BLD AUTO: 0.08 THOUSAND/ΜL (ref 0–0.61)
EOSINOPHIL NFR BLD AUTO: 1 % (ref 0–6)
ERYTHROCYTE [DISTWIDTH] IN BLOOD BY AUTOMATED COUNT: 12.5 % (ref 11.6–15.1)
EST. AVERAGE GLUCOSE BLD GHB EST-MCNC: 117 MG/DL
GFR SERPL CREATININE-BSD FRML MDRD: 58 ML/MIN/1.73SQ M
GLUCOSE P FAST SERPL-MCNC: 92 MG/DL (ref 65–99)
HBA1C MFR BLD: 5.7 %
HCT VFR BLD AUTO: 48.2 % (ref 34.8–46.1)
HDLC SERPL-MCNC: 53 MG/DL
HGB BLD-MCNC: 15.6 G/DL (ref 11.5–15.4)
IMM GRANULOCYTES # BLD AUTO: 0.02 THOUSAND/UL (ref 0–0.2)
IMM GRANULOCYTES NFR BLD AUTO: 0 % (ref 0–2)
LDLC SERPL CALC-MCNC: 194 MG/DL (ref 0–100)
LYMPHOCYTES # BLD AUTO: 2.11 THOUSANDS/ΜL (ref 0.6–4.47)
LYMPHOCYTES NFR BLD AUTO: 28 % (ref 14–44)
MCH RBC QN AUTO: 29.8 PG (ref 26.8–34.3)
MCHC RBC AUTO-ENTMCNC: 32.4 G/DL (ref 31.4–37.4)
MCV RBC AUTO: 92 FL (ref 82–98)
MONOCYTES # BLD AUTO: 0.39 THOUSAND/ΜL (ref 0.17–1.22)
MONOCYTES NFR BLD AUTO: 5 % (ref 4–12)
NEUTROPHILS # BLD AUTO: 4.96 THOUSANDS/ΜL (ref 1.85–7.62)
NEUTS SEG NFR BLD AUTO: 66 % (ref 43–75)
NONHDLC SERPL-MCNC: 223 MG/DL
NRBC BLD AUTO-RTO: 0 /100 WBCS
PLATELET # BLD AUTO: 258 THOUSANDS/UL (ref 149–390)
PMV BLD AUTO: 10.2 FL (ref 8.9–12.7)
POTASSIUM SERPL-SCNC: 4 MMOL/L (ref 3.5–5.3)
PROT SERPL-MCNC: 7.5 G/DL (ref 6.4–8.2)
RBC # BLD AUTO: 5.23 MILLION/UL (ref 3.81–5.12)
SODIUM SERPL-SCNC: 142 MMOL/L (ref 136–145)
TRIGL SERPL-MCNC: 146 MG/DL
TSH SERPL DL<=0.05 MIU/L-ACNC: 1.19 UIU/ML (ref 0.36–3.74)
WBC # BLD AUTO: 7.59 THOUSAND/UL (ref 4.31–10.16)

## 2020-11-03 PROCEDURE — 80053 COMPREHEN METABOLIC PANEL: CPT

## 2020-11-03 PROCEDURE — 85025 COMPLETE CBC W/AUTO DIFF WBC: CPT

## 2020-11-03 PROCEDURE — 36415 COLL VENOUS BLD VENIPUNCTURE: CPT

## 2020-11-03 PROCEDURE — 80061 LIPID PANEL: CPT

## 2020-11-03 PROCEDURE — 83036 HEMOGLOBIN GLYCOSYLATED A1C: CPT

## 2020-11-03 PROCEDURE — 84443 ASSAY THYROID STIM HORMONE: CPT

## 2020-11-17 ENCOUNTER — OFFICE VISIT (OUTPATIENT)
Dept: FAMILY MEDICINE CLINIC | Facility: CLINIC | Age: 65
End: 2020-11-17
Payer: MEDICARE

## 2020-11-17 VITALS
WEIGHT: 190 LBS | SYSTOLIC BLOOD PRESSURE: 120 MMHG | TEMPERATURE: 97.1 F | DIASTOLIC BLOOD PRESSURE: 90 MMHG | BODY MASS INDEX: 29.82 KG/M2 | HEIGHT: 67 IN

## 2020-11-17 DIAGNOSIS — R20.2 PARESTHESIAS: Primary | ICD-10-CM

## 2020-11-17 DIAGNOSIS — R73.01 IMPAIRED FASTING GLUCOSE: ICD-10-CM

## 2020-11-17 PROCEDURE — 99214 OFFICE O/P EST MOD 30 MIN: CPT | Performed by: FAMILY MEDICINE

## 2020-11-17 RX ORDER — A/SINGAPORE/GP1908/2015 IVR-180 (AN A/MICHIGAN/45/2015 (H1N1)PDM09-LIKE VIRUS, A/HONG KONG/4801/2014, NYMC X-263B (H3N2) (AN A/HONG KONG/4801/2014-LIKE VIRUS), AND B/BRISBANE/60/2008, WILD TYPE (A B/BRISBANE/60/2008-LIKE VIRUS) 15; 15; 15 UG/.5ML; UG/.5ML; UG/.5ML
INJECTION, SUSPENSION INTRAMUSCULAR
COMMUNITY
Start: 2020-10-07 | End: 2021-10-26

## 2020-11-18 ENCOUNTER — LAB (OUTPATIENT)
Dept: LAB | Age: 65
End: 2020-11-18
Payer: MEDICARE

## 2020-11-18 DIAGNOSIS — R20.2 PARESTHESIAS: ICD-10-CM

## 2020-11-18 DIAGNOSIS — R73.01 IMPAIRED FASTING GLUCOSE: ICD-10-CM

## 2020-11-18 LAB
ALBUMIN SERPL BCP-MCNC: 4.1 G/DL (ref 3.5–5)
ALP SERPL-CCNC: 103 U/L (ref 46–116)
ALT SERPL W P-5'-P-CCNC: 24 U/L (ref 12–78)
ANION GAP SERPL CALCULATED.3IONS-SCNC: 4 MMOL/L (ref 4–13)
AST SERPL W P-5'-P-CCNC: 13 U/L (ref 5–45)
BASOPHILS # BLD AUTO: 0.06 THOUSANDS/ΜL (ref 0–0.1)
BASOPHILS NFR BLD AUTO: 1 % (ref 0–1)
BILIRUB SERPL-MCNC: 0.55 MG/DL (ref 0.2–1)
BUN SERPL-MCNC: 20 MG/DL (ref 5–25)
CALCIUM SERPL-MCNC: 9.4 MG/DL (ref 8.3–10.1)
CHLORIDE SERPL-SCNC: 107 MMOL/L (ref 100–108)
CO2 SERPL-SCNC: 30 MMOL/L (ref 21–32)
CREAT SERPL-MCNC: 1.05 MG/DL (ref 0.6–1.3)
CRP SERPL QL: <3 MG/L
EOSINOPHIL # BLD AUTO: 0.08 THOUSAND/ΜL (ref 0–0.61)
EOSINOPHIL NFR BLD AUTO: 1 % (ref 0–6)
ERYTHROCYTE [DISTWIDTH] IN BLOOD BY AUTOMATED COUNT: 12.6 % (ref 11.6–15.1)
GFR SERPL CREATININE-BSD FRML MDRD: 55 ML/MIN/1.73SQ M
GLUCOSE P FAST SERPL-MCNC: 101 MG/DL (ref 65–99)
HCT VFR BLD AUTO: 47.4 % (ref 34.8–46.1)
HGB BLD-MCNC: 15.4 G/DL (ref 11.5–15.4)
IMM GRANULOCYTES # BLD AUTO: 0.01 THOUSAND/UL (ref 0–0.2)
IMM GRANULOCYTES NFR BLD AUTO: 0 % (ref 0–2)
LYMPHOCYTES # BLD AUTO: 1.6 THOUSANDS/ΜL (ref 0.6–4.47)
LYMPHOCYTES NFR BLD AUTO: 21 % (ref 14–44)
MAGNESIUM SERPL-MCNC: 2.6 MG/DL (ref 1.6–2.6)
MCH RBC QN AUTO: 30 PG (ref 26.8–34.3)
MCHC RBC AUTO-ENTMCNC: 32.5 G/DL (ref 31.4–37.4)
MCV RBC AUTO: 92 FL (ref 82–98)
MONOCYTES # BLD AUTO: 0.32 THOUSAND/ΜL (ref 0.17–1.22)
MONOCYTES NFR BLD AUTO: 4 % (ref 4–12)
NEUTROPHILS # BLD AUTO: 5.45 THOUSANDS/ΜL (ref 1.85–7.62)
NEUTS SEG NFR BLD AUTO: 73 % (ref 43–75)
NRBC BLD AUTO-RTO: 0 /100 WBCS
PLATELET # BLD AUTO: 242 THOUSANDS/UL (ref 149–390)
PMV BLD AUTO: 10.4 FL (ref 8.9–12.7)
POTASSIUM SERPL-SCNC: 4.2 MMOL/L (ref 3.5–5.3)
PROT SERPL-MCNC: 7.1 G/DL (ref 6.4–8.2)
RBC # BLD AUTO: 5.13 MILLION/UL (ref 3.81–5.12)
SODIUM SERPL-SCNC: 141 MMOL/L (ref 136–145)
WBC # BLD AUTO: 7.52 THOUSAND/UL (ref 4.31–10.16)

## 2020-11-18 PROCEDURE — 83735 ASSAY OF MAGNESIUM: CPT

## 2020-11-18 PROCEDURE — 80053 COMPREHEN METABOLIC PANEL: CPT

## 2020-11-18 PROCEDURE — 86140 C-REACTIVE PROTEIN: CPT

## 2020-11-18 PROCEDURE — 85025 COMPLETE CBC W/AUTO DIFF WBC: CPT

## 2020-11-18 PROCEDURE — 86618 LYME DISEASE ANTIBODY: CPT

## 2020-11-18 PROCEDURE — 36415 COLL VENOUS BLD VENIPUNCTURE: CPT

## 2020-11-19 ENCOUNTER — TELEPHONE (OUTPATIENT)
Dept: FAMILY MEDICINE CLINIC | Facility: CLINIC | Age: 65
End: 2020-11-19

## 2020-11-19 LAB — B BURGDOR IGG+IGM SER-ACNC: 12

## 2020-11-20 ENCOUNTER — OFFICE VISIT (OUTPATIENT)
Dept: FAMILY MEDICINE CLINIC | Facility: CLINIC | Age: 65
End: 2020-11-20
Payer: MEDICARE

## 2020-11-20 VITALS
TEMPERATURE: 97.2 F | DIASTOLIC BLOOD PRESSURE: 70 MMHG | SYSTOLIC BLOOD PRESSURE: 110 MMHG | WEIGHT: 188.4 LBS | BODY MASS INDEX: 29.57 KG/M2 | HEIGHT: 67 IN

## 2020-11-20 DIAGNOSIS — T78.40XA ALLERGIC REACTION, INITIAL ENCOUNTER: Primary | ICD-10-CM

## 2020-11-20 DIAGNOSIS — Z12.11 SCREENING FOR COLORECTAL CANCER: ICD-10-CM

## 2020-11-20 DIAGNOSIS — Z12.12 SCREENING FOR COLORECTAL CANCER: ICD-10-CM

## 2020-11-20 DIAGNOSIS — Z23 ENCOUNTER FOR IMMUNIZATION: ICD-10-CM

## 2020-11-20 PROCEDURE — 99213 OFFICE O/P EST LOW 20 MIN: CPT | Performed by: FAMILY MEDICINE

## 2020-11-20 RX ORDER — TRIAMCINOLONE ACETONIDE 1 MG/G
CREAM TOPICAL 2 TIMES DAILY
Qty: 30 G | Refills: 2 | Status: SHIPPED | OUTPATIENT
Start: 2020-11-20 | End: 2021-01-06

## 2021-01-06 ENCOUNTER — TELEMEDICINE (OUTPATIENT)
Dept: FAMILY MEDICINE CLINIC | Facility: CLINIC | Age: 66
End: 2021-01-06
Payer: MEDICARE

## 2021-01-06 VITALS — BODY MASS INDEX: 29.51 KG/M2 | WEIGHT: 188 LBS | TEMPERATURE: 97.8 F | HEIGHT: 67 IN

## 2021-01-06 DIAGNOSIS — Z20.822 EXPOSURE TO COVID-19 VIRUS: Primary | ICD-10-CM

## 2021-01-06 DIAGNOSIS — K21.9 GERD WITHOUT ESOPHAGITIS: ICD-10-CM

## 2021-01-06 DIAGNOSIS — Z20.822 EXPOSURE TO COVID-19 VIRUS: ICD-10-CM

## 2021-01-06 PROCEDURE — 99214 OFFICE O/P EST MOD 30 MIN: CPT | Performed by: FAMILY MEDICINE

## 2021-01-06 PROCEDURE — U0003 INFECTIOUS AGENT DETECTION BY NUCLEIC ACID (DNA OR RNA); SEVERE ACUTE RESPIRATORY SYNDROME CORONAVIRUS 2 (SARS-COV-2) (CORONAVIRUS DISEASE [COVID-19]), AMPLIFIED PROBE TECHNIQUE, MAKING USE OF HIGH THROUGHPUT TECHNOLOGIES AS DESCRIBED BY CMS-2020-01-R: HCPCS | Performed by: FAMILY MEDICINE

## 2021-01-06 PROCEDURE — U0005 INFEC AGEN DETEC AMPLI PROBE: HCPCS | Performed by: FAMILY MEDICINE

## 2021-01-06 RX ORDER — OMEPRAZOLE 20 MG/1
20 CAPSULE, DELAYED RELEASE ORAL DAILY
Qty: 90 CAPSULE | Refills: 1 | Status: SHIPPED | OUTPATIENT
Start: 2021-01-06 | End: 2022-02-21 | Stop reason: SDUPTHER

## 2021-01-06 NOTE — PROGRESS NOTES
COVID-19 Virtual Visit     Assessment/Plan:    Problem List Items Addressed This Visit        Digestive    GERD without esophagitis    Relevant Medications    omeprazole (PriLOSEC) 20 mg delayed release capsule      Other Visit Diagnoses     Exposure to COVID-19 virus    -  Primary    Relevant Orders    Novel Coronavirus (COVID-19), PCR [facility] - Collected at [facility] or [facility]         Disposition:     I recommended self-quarantine for 14 days and to call back for worsening symptoms or development of shortness of breath  I referred patient to one of our centralized sites for a COVID-19 swab  I have spent 15 minutes directly with the patient  Greater than 50% of this time was spent in counseling/coordination of care regarding: instructions for management and patient and family education  Encounter provider Shasha Marie DO    Provider located at [address] [place] [id number]    Recent Visits  No visits were found meeting these conditions  Showing recent visits within past 7 days and meeting all other requirements     Today's Visits  Date Type Provider Dept   01/06/21 Telemedicine Cr Fraser DO [address] [facility] today's visits and meeting all other requirements     Future Appointments  No visits were found meeting these conditions  Showing future appointments within next 150 days and meeting all other requirements      This virtual check-in was done via [employer] and patient was informed that this is a secure, HIPAA-compliant platform  She agrees to proceed  Patient agrees to participate in a virtual check in via telephone or video visit instead of presenting to the office to address urgent/immediate medical needs  Patient is aware this is a billable service  After connecting through [facility], the patient was identified by name and date of birth   Miller Hook was informed that this was a telemedicine visit and that the exam was being conducted confidentially over secure lines  My office door was closed  No one else was in the room  Pallavi Stephen acknowledged consent and understanding of privacy and security of the telemedicine visit  I informed the patient that I have reviewed her record in Epic and presented the opportunity for her to ask any questions regarding the visit today  The patient agreed to participate  Subjective:   Pallavi Stephen is a 77 y o  female who is concerned about COVID-19  Patient is asymptomatic  Date of symptom onset: 1/5/2021    Patient's symptoms include sore throat  Patient denies fever, chills, fatigue, malaise, congestion, rhinorrhea, anosmia, loss of taste, cough, shortness of breath, chest tightness, abdominal pain, nausea, vomiting, diarrhea, myalgias and headaches       Exposure:   Contact with a person who is under investigation (PUI) for or who is positive for COVID-19 within the last 14 days?: No    Hospitalized recently for fever and/or lower respiratory symptoms?: No      Currently a healthcare worker that is involved in direct patient care?: No      Works in a special setting where the risk of COVID-19 transmission may be high? (this may include long-term care, correctional and FPC facilities; homeless shelters; assisted-living facilities and group homes ): No      Resident in a special setting where the risk of COVID-19 transmission may be high? (this may include long-term care, correctional and FPC facilities; homeless shelters; assisted-living facilities and group homes ): No      No results found for: [address] [facility] [address] [facility], [address] [facility] [address]  Past Medical History:   Diagnosis Date    Bell's palsy      Past Surgical History:   Procedure Laterality Date    LIPOSUCTION      Thighs     Current Outpatient Medications   Medication Sig Dispense Refill    aspirin 81 mg chewable tablet Chew 81 mg      Fluad Quadrivalent 0 5 ML PRSY inject 0 5 milliliters intramuscularly      Multiple Vitamins-Minerals (MULTIVITAMIN GUMMIES ADULT PO) Take by mouth      ofloxacin (OCUFLOX) 0 3 % ophthalmic solution Administer 1 drop into the left eye 3 (three) times a day 5 mL 0    omeprazole (PriLOSEC) 20 mg delayed release capsule Take 1 capsule (20 mg total) by mouth daily 90 capsule 1     No current facility-administered medications for this visit  Allergies   Allergen Reactions    Bee Venom     Codeine Nausea Only and Vomiting    Other      Action Taken: SHRIMP  ANESTHESIA "DEATHLY ILL"  NARCOTICS;     Shrimp Extract Allergy Skin Test     Statins     Zoster Vac Recomb Adjuvanted Rash and Headache       Review of Systems   Constitutional: Negative  Negative for chills, fatigue and fever  HENT: Positive for sore throat  Negative for congestion and rhinorrhea  Eyes: Negative  Respiratory: Negative  Negative for cough, chest tightness and shortness of breath  Cardiovascular: Negative  Gastrointestinal: Negative  Negative for abdominal pain, diarrhea, nausea and vomiting  Endocrine: Negative  Genitourinary: Negative  Musculoskeletal: Negative  Negative for myalgias  Skin: Negative  Allergic/Immunologic: Negative  Neurological: Negative  Negative for headaches  Hematological: Negative  Psychiatric/Behavioral: Negative  Objective:    Vitals:    01/06/21 1143   Temp: 97 8 °F (36 6 °C)   TempSrc: Temporal   Weight: 85 3 kg (188 lb)   Height: 5' 6 75" (1 695 m)       Physical Exam  Constitutional:       General: She is not in acute distress  Appearance: Normal appearance  She is not ill-appearing, toxic-appearing or diaphoretic  HENT:      Head: Normocephalic and atraumatic  Nose: Nose normal    Pulmonary:      Effort: Pulmonary effort is normal    Neurological:      Mental Status: She is alert     Psychiatric:         Mood and Affect: Mood normal          Behavior: Behavior normal          Thought Content: Thought content normal        VIRTUAL VISIT DISCLAIMER    Heath Parsons acknowledges that she has consented to an online visit or consultation  She understands that the online visit is based solely on information provided by her, and that, in the absence of a face-to-face physical evaluation by the physician, the diagnosis she receives is both limited and provisional in terms of accuracy and completeness  This is not intended to replace a full medical face-to-face evaluation by the physician  Heath Parsons understands and accepts these terms

## 2021-01-07 LAB — SARS-COV-2 RNA SPEC QL NAA+PROBE: NOT DETECTED

## 2021-04-15 ENCOUNTER — HOSPITAL ENCOUNTER (EMERGENCY)
Facility: HOSPITAL | Age: 66
Discharge: HOME | End: 2021-04-15
Attending: EMERGENCY MEDICINE | Admitting: EMERGENCY MEDICINE
Payer: MEDICARE

## 2021-04-15 ENCOUNTER — OFFICE VISIT (OUTPATIENT)
Dept: FAMILY MEDICINE CLINIC | Facility: CLINIC | Age: 66
End: 2021-04-15
Payer: MEDICARE

## 2021-04-15 ENCOUNTER — APPOINTMENT (EMERGENCY)
Dept: RADIOLOGY | Facility: HOSPITAL | Age: 66
End: 2021-04-15
Payer: MEDICARE

## 2021-04-15 VITALS
DIASTOLIC BLOOD PRESSURE: 103 MMHG | SYSTOLIC BLOOD PRESSURE: 174 MMHG | HEART RATE: 87 BPM | RESPIRATION RATE: 16 BRPM | OXYGEN SATURATION: 100 % | TEMPERATURE: 98 F

## 2021-04-15 VITALS
DIASTOLIC BLOOD PRESSURE: 72 MMHG | TEMPERATURE: 96.9 F | HEIGHT: 67 IN | SYSTOLIC BLOOD PRESSURE: 110 MMHG | WEIGHT: 187 LBS | BODY MASS INDEX: 29.35 KG/M2

## 2021-04-15 DIAGNOSIS — R07.9 CHEST PAIN WITH LOW RISK OF ACUTE CORONARY SYNDROME: Primary | ICD-10-CM

## 2021-04-15 DIAGNOSIS — R07.89 ATYPICAL CHEST PAIN: Primary | ICD-10-CM

## 2021-04-15 PROBLEM — M23.305 DERANGEMENT OF MEDIAL MENISCUS: Status: ACTIVE | Noted: 2017-11-17

## 2021-04-15 LAB
ANION GAP SERPL CALCULATED.3IONS-SCNC: 8 MMOL/L (ref 4–13)
ATRIAL RATE: 76 BPM
BASOPHILS # BLD AUTO: 0.05 THOUSANDS/ΜL (ref 0–0.1)
BASOPHILS NFR BLD AUTO: 1 % (ref 0–1)
BUN SERPL-MCNC: 22 MG/DL (ref 5–25)
CALCIUM SERPL-MCNC: 9.1 MG/DL (ref 8.3–10.1)
CHLORIDE SERPL-SCNC: 106 MMOL/L (ref 100–108)
CO2 SERPL-SCNC: 28 MMOL/L (ref 21–32)
CREAT SERPL-MCNC: 0.92 MG/DL (ref 0.6–1.3)
EOSINOPHIL # BLD AUTO: 0.18 THOUSAND/ΜL (ref 0–0.61)
EOSINOPHIL NFR BLD AUTO: 2 % (ref 0–6)
ERYTHROCYTE [DISTWIDTH] IN BLOOD BY AUTOMATED COUNT: 12 % (ref 11.6–15.1)
GFR SERPL CREATININE-BSD FRML MDRD: 65 ML/MIN/1.73SQ M
GLUCOSE SERPL-MCNC: 91 MG/DL (ref 65–140)
HCT VFR BLD AUTO: 42.4 % (ref 34.8–46.1)
HGB BLD-MCNC: 14.2 G/DL (ref 11.5–15.4)
IMM GRANULOCYTES # BLD AUTO: 0.03 THOUSAND/UL (ref 0–0.2)
IMM GRANULOCYTES NFR BLD AUTO: 0 % (ref 0–2)
LYMPHOCYTES # BLD AUTO: 2.38 THOUSANDS/ΜL (ref 0.6–4.47)
LYMPHOCYTES NFR BLD AUTO: 30 % (ref 14–44)
MCH RBC QN AUTO: 30 PG (ref 26.8–34.3)
MCHC RBC AUTO-ENTMCNC: 33.5 G/DL (ref 31.4–37.4)
MCV RBC AUTO: 90 FL (ref 82–98)
MONOCYTES # BLD AUTO: 0.44 THOUSAND/ΜL (ref 0.17–1.22)
MONOCYTES NFR BLD AUTO: 6 % (ref 4–12)
NEUTROPHILS # BLD AUTO: 4.8 THOUSANDS/ΜL (ref 1.85–7.62)
NEUTS SEG NFR BLD AUTO: 61 % (ref 43–75)
NRBC BLD AUTO-RTO: 0 /100 WBCS
P AXIS: 38 DEGREES
PLATELET # BLD AUTO: 233 THOUSANDS/UL (ref 149–390)
PMV BLD AUTO: 9.3 FL (ref 8.9–12.7)
POTASSIUM SERPL-SCNC: 4 MMOL/L (ref 3.5–5.3)
PR INTERVAL: 180 MS
QRS AXIS: 48 DEGREES
QRSD INTERVAL: 82 MS
QT INTERVAL: 396 MS
QTC INTERVAL: 445 MS
RBC # BLD AUTO: 4.74 MILLION/UL (ref 3.81–5.12)
SODIUM SERPL-SCNC: 142 MMOL/L (ref 136–145)
T WAVE AXIS: 60 DEGREES
TROPONIN I SERPL-MCNC: <0.02 NG/ML
VENTRICULAR RATE: 76 BPM
WBC # BLD AUTO: 7.88 THOUSAND/UL (ref 4.31–10.16)

## 2021-04-15 PROCEDURE — 93010 ELECTROCARDIOGRAM REPORT: CPT | Performed by: INTERNAL MEDICINE

## 2021-04-15 PROCEDURE — 71045 X-RAY EXAM CHEST 1 VIEW: CPT

## 2021-04-15 PROCEDURE — 99283 EMERGENCY DEPT VISIT LOW MDM: CPT | Performed by: EMERGENCY MEDICINE

## 2021-04-15 PROCEDURE — 99285 EMERGENCY DEPT VISIT HI MDM: CPT

## 2021-04-15 PROCEDURE — 93000 ELECTROCARDIOGRAM COMPLETE: CPT | Performed by: FAMILY MEDICINE

## 2021-04-15 PROCEDURE — 80048 BASIC METABOLIC PNL TOTAL CA: CPT | Performed by: EMERGENCY MEDICINE

## 2021-04-15 PROCEDURE — 84484 ASSAY OF TROPONIN QUANT: CPT | Performed by: EMERGENCY MEDICINE

## 2021-04-15 PROCEDURE — 36415 COLL VENOUS BLD VENIPUNCTURE: CPT | Performed by: EMERGENCY MEDICINE

## 2021-04-15 PROCEDURE — 99214 OFFICE O/P EST MOD 30 MIN: CPT | Performed by: FAMILY MEDICINE

## 2021-04-15 PROCEDURE — 85025 COMPLETE CBC W/AUTO DIFF WBC: CPT | Performed by: EMERGENCY MEDICINE

## 2021-04-15 PROCEDURE — 93005 ELECTROCARDIOGRAM TRACING: CPT

## 2021-04-15 RX ORDER — LIDOCAINE 40 MG/G
CREAM TOPICAL AS NEEDED
Qty: 30 G | Refills: 0 | Status: SHIPPED | OUTPATIENT
Start: 2021-04-15 | End: 2021-10-26

## 2021-04-15 RX ORDER — SODIUM CHLORIDE 9 MG/ML
3 INJECTION INTRAVENOUS
Status: DISCONTINUED | OUTPATIENT
Start: 2021-04-15 | End: 2021-04-15 | Stop reason: HOSPADM

## 2021-04-15 NOTE — ED PROVIDER NOTES
History  Chief Complaint   Patient presents with    Chest Pain     Started with an episode of chest pain tuesday night  pain resolved and then returned  seen at PCP today  had EKG performed  referred to ED by PCP  no cardiac hx  patient has no complaints at this time  78 y/o F presents for evaluation of substernal chest pain which woke patient from sleep around 1-2 am   Pt states the pain was sharp, severe, and has slowly improved through out the day to a persistent soreness  The pain is constant, nonradiating, worse with movement/touching, better with rest  It is not pleuritic  No risk factors for dvt/pe  No n/v, f/c, reflux symptoms, cough, uri symtpoms, edwin, calf pain, dyspnea, martinez, orthopnea, diaphoresis  No hx similar symptoms  Pt was seen at pcp today with concern for abnormal cxr referred to ed, and provided script for stress test       History provided by:  Patient  Chest Pain      Prior to Admission Medications   Prescriptions Last Dose Informant Patient Reported? Taking? Fluad Quadrivalent 0 5 ML PRSY  Self Yes No   Sig: inject 0 5 milliliters intramuscularly   Multiple Vitamins-Minerals (MULTIVITAMIN GUMMIES ADULT PO)  Self Yes No   Sig: Take by mouth   aspirin 81 mg chewable tablet  Self Yes No   Sig: Chew 81 mg   ofloxacin (OCUFLOX) 0 3 % ophthalmic solution  Self No No   Sig: Administer 1 drop into the left eye 3 (three) times a day   Patient not taking: Reported on 4/15/2021   omeprazole (PriLOSEC) 20 mg delayed release capsule  Self No No   Sig: Take 1 capsule (20 mg total) by mouth daily      Facility-Administered Medications: None       Past Medical History:   Diagnosis Date    Bell's palsy        Past Surgical History:   Procedure Laterality Date    LIPOSUCTION      Thighs       History reviewed  No pertinent family history  I have reviewed and agree with the history as documented      E-Cigarette/Vaping    E-Cigarette Use Never User      E-Cigarette/Vaping Substances    Nicotine No  THC No     CBD No     Flavoring No     Other No     Unknown No      Social History     Tobacco Use    Smoking status: Former Smoker    Smokeless tobacco: Never Used   Substance Use Topics    Alcohol use: Yes    Drug use: No       Review of Systems   Cardiovascular: Positive for chest pain  Physical Exam  Physical Exam  Constitutional:       Appearance: She is well-developed  HENT:      Head: Normocephalic and atraumatic  Eyes:      Pupils: Pupils are equal, round, and reactive to light  Neck:      Vascular: No JVD  Trachea: No tracheal deviation  Cardiovascular:      Rate and Rhythm: Normal rate and regular rhythm  Pulmonary:      Effort: No tachypnea, accessory muscle usage or respiratory distress  Breath sounds: Normal breath sounds  Chest:      Chest wall: Tenderness (substernal) present  Abdominal:      General: There is no distension  Musculoskeletal:      Right lower leg: Normal       Left lower leg: Normal    Skin:     General: Skin is warm  Capillary Refill: Capillary refill takes less than 2 seconds  Neurological:      Mental Status: She is alert and oriented to person, place, and time     Psychiatric:         Behavior: Behavior normal          Vital Signs  ED Triage Vitals [04/15/21 1615]   Temperature Pulse Respirations Blood Pressure SpO2   98 °F (36 7 °C) 87 16 (!) 174/103 100 %      Temp Source Heart Rate Source Patient Position - Orthostatic VS BP Location FiO2 (%)   Oral Monitor Sitting Right arm --      Pain Score       --           Vitals:    04/15/21 1615   BP: (!) 174/103   Pulse: 87   Patient Position - Orthostatic VS: Sitting         Visual Acuity      ED Medications  Medications   sodium chloride (PF) 0 9 % injection 3 mL (has no administration in time range)       Diagnostic Studies  Results Reviewed     Procedure Component Value Units Date/Time    Troponin I [[id number]]  (Normal) Collected: 04/15/21 1745    Lab Status: Final result Specimen: Blood from Arm, Left Updated: 04/15/21 1851     Troponin I <0 02 ng/mL     Basic metabolic panel [[id number]] Collected: 04/15/21 1745    Lab Status: Final result Specimen: Blood from Arm, Left Updated: 04/15/21 1842     Sodium 142 mmol/L      Potassium 4 0 mmol/L      Chloride 106 mmol/L      CO2 28 mmol/L      ANION GAP 8 mmol/L      BUN 22 mg/dL      Creatinine 0 92 mg/dL      Glucose 91 mg/dL      Calcium 9 1 mg/dL      eGFR 65 ml/min/1 73sq m     Narrative:      [place] guidelines for Chronic Kidney Disease (CKD):     Stage 1 with normal or high GFR (GFR > 90 mL/min/1 73 square meters)    Stage 2 Mild CKD (GFR = 60-89 mL/min/1 73 square meters)    Stage 3A Moderate CKD (GFR = 45-59 mL/min/1 73 square meters)    Stage 3B Moderate CKD (GFR = 30-44 mL/min/1 73 square meters)    Stage 4 Severe CKD (GFR = 15-29 mL/min/1 73 square meters)    Stage 5 End Stage CKD (GFR <15 mL/min/1 73 square meters)  Note: GFR calculation is accurate only with a steady state creatinine    CBC and differential [[id number]] Collected: 04/15/21 1745    Lab Status: Final result Specimen: Blood from Arm, Left Updated: 04/15/21 1810     WBC 7 88 Thousand/uL      RBC 4 74 Million/uL      Hemoglobin 14 2 g/dL      Hematocrit 42 4 %      MCV 90 fL      MCH 30 0 pg      MCHC 33 5 g/dL      RDW 12 0 %      MPV 9 3 fL      Platelets 248 Thousands/uL      nRBC 0 /100 WBCs      Neutrophils Relative 61 %      Immat GRANS % 0 %      Lymphocytes Relative 30 %      Monocytes Relative 6 %      Eosinophils Relative 2 %      Basophils Relative 1 %      Neutrophils Absolute 4 80 Thousands/µL      Immature Grans Absolute 0 03 Thousand/uL      Lymphocytes Absolute 2 38 Thousands/µL      Monocytes Absolute 0 44 Thousand/µL      Eosinophils Absolute 0 18 Thousand/µL      Basophils Absolute 0 05 Thousands/µL                  X-ray chest 1 view portable   ED Interpretation by Diana Orellana MD (04/15 1808)   Primary reviewed: no acute abnormality                 Procedures  ECG 12 Lead Documentation Only    Date/Time: 4/15/2021 6:10 PM  Performed by: Poppy Bullock MD  Authorized by: Poppy Bullock MD     ECG reviewed by me, the ED Provider: yes    Patient location:  ED  Rate:     ECG rate:  76    ECG rate assessment: normal    Rhythm:     Rhythm: sinus rhythm    Ectopy:     Ectopy: none    QRS:     QRS axis:  Normal    QRS intervals:  Normal  Conduction:     Conduction: normal    ST segments:     ST segments:  Normal             ED Course                                           MDM  Number of Diagnoses or Management Options  Diagnosis management comments: Reproducible cp with lwo risk heart score and no findings to suggest pe-will do cardiac work up with single ekg/trop given duration of symptoms greater than 6 hours, tx for msk chest pain, pt to keep outpatient stress test      Disposition  Final diagnoses:   Chest pain with low risk of acute coronary syndrome     Time reflects when diagnosis was documented in both MDM as applicable and the Disposition within this note     Time User Action Codes Description Comment    4/15/2021  6:30 PM Juanjo Dubon Add [R07 9] Chest pain with low risk of acute coronary syndrome       ED Disposition     ED Disposition Condition Date/Time Comment    Discharge Stable Thu Apr 15, 2021  6:30 PM Raman Berger discharge to home/self care              Follow-up Information     Follow up With Specialties Details Why Contact Info Additional Information    Didi Dillon,  Family Medicine Schedule an appointment as soon as possible for a visit in 2 days  [address] Sched[address] Central Scheduling Schedule an appointment as soon as possible for a visit in 2 days Schedule your stress test [address] [id number]  [address] [place] [address], [phone]   [phone] Patient's Medications   Discharge Prescriptions    LIDOCAINE (LMX) 4 % CREAM    Apply topically as needed for moderate pain       Start Date: 4/15/2021 End Date: --       Order Dose: --       Quantity: 30 g    Refills: 0     No discharge procedures on file      PDMP Review     None          ED Provider  Electronically Signed by           Michel Messina MD  04/15/21 8525

## 2021-04-15 NOTE — PROGRESS NOTES
Assessment/Plan:    She works hard and is under a lot of stress which is normal for her  I do recommend we check the stress test before she goes out of town  She apparently has a meeting tomorrow that she has to go to and she will be out of town for the next 3 or 4 days  I would be more comfortable to get the stress test before she leaves town but she may have to wait until she gets back  I do recommend seeking care at emergency department immediately should her symptoms return  Also recommend a full strength aspirin a day until we have some results  After reviewing the electrocardiogram with the patient she agrees to be evaluated emergency department tonight  I did offer to call her daughter who is a physician but she would rather hold off on that for now  Diagnoses and all orders for this visit:    Atypical chest pain  -     POCT ECG  -     Stress test only, exercise; Future            Subjective:        Patient ID: Osmel Erickson is a 77 y o  female  Patient presents with:  Chest Pain: started last night  recently got 2nd COVID inj  Pt feels a soreness  Pain is mostly in the breast area    SHE RELATES A HISTORY OF AN ATYPICAL CHEST PAIN STARTS IN THE CENTER OF HER BREAST BONE  MADE WORSE WITH PRESSING ON IT  The following portions of the patient's history were reviewed and updated as appropriate: allergies, current medications, past family history, past medical history, past social history, past surgical history and problem list       Review of Systems   Constitutional: Negative  HENT: Negative  Eyes: Negative  Respiratory: Negative  Cardiovascular: Positive for chest pain  As noted in HPI   Gastrointestinal: Negative  Endocrine: Negative  Genitourinary: Negative  Musculoskeletal: Negative  Skin: Negative  Allergic/Immunologic: Negative  Neurological: Negative  Hematological: Negative  Psychiatric/Behavioral: Negative      All other systems reviewed and are negative  Objective:      BMI Counseling: Body mass index is 29 51 kg/m²  The BMI is above normal  Nutrition recommendations include decreasing portion sizes, encouraging healthy choices of fruits and vegetables, decreasing fast food intake, consuming healthier snacks, limiting drinks that contain sugar, moderation in carbohydrate intake, increasing intake of lean protein, reducing intake of saturated and trans fat and reducing intake of cholesterol  Exercise recommendations include moderate physical activity 150 minutes/week  No pharmacotherapy was ordered  /72 (BP Location: Left arm, Patient Position: Sitting, Cuff Size: Standard)   Temp (!) 96 9 °F (36 1 °C) (Tympanic)   Ht 5' 6 75" (1 695 m)   Wt 84 8 kg (187 lb)   BMI 29 51 kg/m²          Physical Exam  Vitals signs and nursing note reviewed  Constitutional:       Appearance: She is well-developed  HENT:      Head: Normocephalic and atraumatic  Right Ear: External ear normal       Left Ear: External ear normal       Nose: Nose normal    Eyes:      Conjunctiva/sclera: Conjunctivae normal       Pupils: Pupils are equal, round, and reactive to light  Neck:      Musculoskeletal: Normal range of motion and neck supple  Cardiovascular:      Rate and Rhythm: Normal rate and regular rhythm  Heart sounds: Normal heart sounds  Pulmonary:      Effort: Pulmonary effort is normal       Breath sounds: Normal breath sounds  Abdominal:      General: Bowel sounds are normal       Palpations: Abdomen is soft  Musculoskeletal: Normal range of motion  Skin:     General: Skin is warm and dry  Neurological:      Mental Status: She is alert and oriented to person, place, and time  Deep Tendon Reflexes: Reflexes are normal and symmetric     Psychiatric:         Behavior: Behavior normal

## 2021-04-16 ENCOUNTER — TELEPHONE (OUTPATIENT)
Dept: FAMILY MEDICINE CLINIC | Facility: CLINIC | Age: 66
End: 2021-04-16

## 2021-04-16 NOTE — TELEPHONE ENCOUNTER
Patient called in and said she had an EKG in our office and was sent to ED because EKG showed she had heart attacks   The ED did a few EKGS and blood work and it was all normal

## 2021-05-04 ENCOUNTER — HOSPITAL ENCOUNTER (OUTPATIENT)
Dept: NON INVASIVE DIAGNOSTICS | Facility: HOSPITAL | Age: 66
Discharge: HOME | End: 2021-05-04
Payer: MEDICARE

## 2021-05-04 DIAGNOSIS — R07.89 ATYPICAL CHEST PAIN: ICD-10-CM

## 2021-05-04 PROCEDURE — 93018 CV STRESS TEST I&R ONLY: CPT

## 2021-05-04 PROCEDURE — 93017 CV STRESS TEST TRACING ONLY: CPT

## 2021-05-04 PROCEDURE — 93016 CV STRESS TEST SUPVJ ONLY: CPT

## 2021-05-07 LAB
CHEST PAIN STATEMENT: NORMAL
MAX DIASTOLIC BP: 66 MMHG
MAX HEART RATE: 137 BPM
MAX PREDICTED HEART RATE: 153 BPM
MAX. SYSTOLIC BP: 186 MMHG
PROTOCOL NAME: NORMAL
REASON FOR TERMINATION: NORMAL
TARGET HR FORMULA: NORMAL
TEST INDICATION: NORMAL
TIME IN EXERCISE PHASE: NORMAL

## 2021-10-26 ENCOUNTER — NURSE TRIAGE (OUTPATIENT)
Dept: OTHER | Facility: OTHER | Age: 66
End: 2021-10-26

## 2021-10-26 ENCOUNTER — OFFICE VISIT (OUTPATIENT)
Dept: FAMILY MEDICINE CLINIC | Facility: CLINIC | Age: 66
End: 2021-10-26
Payer: MEDICARE

## 2021-10-26 ENCOUNTER — APPOINTMENT (OUTPATIENT)
Dept: RADIOLOGY | Age: 66
End: 2021-10-26
Payer: MEDICARE

## 2021-10-26 VITALS — TEMPERATURE: 97 F

## 2021-10-26 DIAGNOSIS — J01.00 ACUTE NON-RECURRENT MAXILLARY SINUSITIS: Primary | ICD-10-CM

## 2021-10-26 DIAGNOSIS — R05.9 COUGH: ICD-10-CM

## 2021-10-26 DIAGNOSIS — J01.00 ACUTE NON-RECURRENT MAXILLARY SINUSITIS: ICD-10-CM

## 2021-10-26 PROCEDURE — 99213 OFFICE O/P EST LOW 20 MIN: CPT | Performed by: FAMILY MEDICINE

## 2021-10-26 PROCEDURE — 71046 X-RAY EXAM CHEST 2 VIEWS: CPT

## 2021-10-26 RX ORDER — LEVOFLOXACIN 500 MG/1
500 TABLET, FILM COATED ORAL EVERY 24 HOURS
Qty: 7 TABLET | Refills: 0 | Status: SHIPPED | OUTPATIENT
Start: 2021-10-26 | End: 2021-11-02

## 2022-02-21 ENCOUNTER — OFFICE VISIT (OUTPATIENT)
Dept: FAMILY MEDICINE CLINIC | Facility: CLINIC | Age: 67
End: 2022-02-21
Payer: MEDICARE

## 2022-02-21 VITALS
SYSTOLIC BLOOD PRESSURE: 124 MMHG | HEIGHT: 67 IN | HEART RATE: 75 BPM | TEMPERATURE: 96.8 F | BODY MASS INDEX: 29.82 KG/M2 | DIASTOLIC BLOOD PRESSURE: 80 MMHG | OXYGEN SATURATION: 95 % | RESPIRATION RATE: 16 BRPM | WEIGHT: 190 LBS

## 2022-02-21 DIAGNOSIS — R20.2 PARESTHESIAS: ICD-10-CM

## 2022-02-21 DIAGNOSIS — R20.0 LEFT UPPER EXTREMITY NUMBNESS: ICD-10-CM

## 2022-02-21 DIAGNOSIS — K21.9 GERD WITHOUT ESOPHAGITIS: ICD-10-CM

## 2022-02-21 DIAGNOSIS — R73.01 IMPAIRED FASTING GLUCOSE: ICD-10-CM

## 2022-02-21 DIAGNOSIS — Z13.6 SCREENING FOR CARDIOVASCULAR CONDITION: ICD-10-CM

## 2022-02-21 DIAGNOSIS — Z12.31 ENCOUNTER FOR SCREENING MAMMOGRAM FOR BREAST CANCER: ICD-10-CM

## 2022-02-21 DIAGNOSIS — E78.2 MIXED HYPERLIPIDEMIA: ICD-10-CM

## 2022-02-21 DIAGNOSIS — R20.0 RIGHT FACIAL NUMBNESS: Primary | ICD-10-CM

## 2022-02-21 DIAGNOSIS — Z00.00 MEDICARE ANNUAL WELLNESS VISIT, SUBSEQUENT: ICD-10-CM

## 2022-02-21 PROCEDURE — 99214 OFFICE O/P EST MOD 30 MIN: CPT | Performed by: FAMILY MEDICINE

## 2022-02-21 PROCEDURE — 1123F ACP DISCUSS/DSCN MKR DOCD: CPT | Performed by: FAMILY MEDICINE

## 2022-02-21 PROCEDURE — G0438 PPPS, INITIAL VISIT: HCPCS | Performed by: FAMILY MEDICINE

## 2022-02-21 RX ORDER — OMEPRAZOLE 20 MG/1
20 CAPSULE, DELAYED RELEASE ORAL DAILY
Qty: 90 CAPSULE | Refills: 1 | Status: SHIPPED | OUTPATIENT
Start: 2022-02-21

## 2022-02-21 NOTE — PROGRESS NOTES
Assessment and Plan:     Problem List Items Addressed This Visit        Digestive    GERD without esophagitis           Preventive health issues were discussed with patient, and age appropriate screening tests were ordered as noted in patient's After Visit Summary  Personalized health advice and appropriate referrals for health education or preventive services given if needed, as noted in patient's After Visit Summary  History of Present Illness:     Patient presents for Medicare Annual Wellness visit    Patient Care Team:  Marti Olsen DO as PCP - General     Problem List:     Patient Active Problem List   Diagnosis    Mixed hyperlipidemia    GERD without esophagitis    Impaired fasting glucose    Chronic fatigue    Dermatitis    Well adult exam    Acute non-recurrent maxillary sinusitis    Acute bacterial conjunctivitis of left eye    Paresthesias    Allergic reaction    Other dyspnea and respiratory abnormality    Derangement of medial meniscus    Cough      Past Medical and Surgical History:     Past Medical History:   Diagnosis Date    Bell's palsy      Past Surgical History:   Procedure Laterality Date    LIPOSUCTION      Thighs      Family History:     History reviewed  No pertinent family history  Social History:     Social History     Socioeconomic History    Marital status: /Civil Union     Spouse name: None    Number of children: None    Years of education: None    Highest education level: None   Occupational History    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   Tobacco Use    Smoking status: Former Smoker    Smokeless tobacco: Never Used   Vaping Use    Vaping Use: Never used   Substance and Sexual Activity    Alcohol use:  Yes    Drug use: No    Sexual activity: None   Other Topics Concern    None   Social History Narrative    Always uses helmet    Always uses seatbelt     Caffeine use     Exercises regularly     Guns in the home: stored in locked cabinet     Pt has living will     Social Determinants of Health     Financial Resource Strain: Not on file   Food Insecurity: Not on file   Transportation Needs: Not on file   Physical Activity: Not on file   Stress: Not on file   Social Connections: Not on file   Intimate Partner Violence: Not on file   Housing Stability: Not on file      Medications and Allergies:     Current Outpatient Medications   Medication Sig Dispense Refill    aspirin 81 mg chewable tablet Chew 81 mg      Multiple Vitamins-Minerals (MULTIVITAMIN GUMMIES ADULT PO) Take by mouth      omeprazole (PriLOSEC) 20 mg delayed release capsule Take 1 capsule (20 mg total) by mouth daily 90 capsule 1     No current facility-administered medications for this visit       Allergies   Allergen Reactions    Bee Venom     Codeine Nausea Only and Vomiting    Other      Action Taken: SHRIMP  ANESTHESIA "DEATHLY ILL"  NARCOTICS;     Shrimp Extract Allergy Skin Test - Food Allergy     Statins     Zoster Vac Recomb Adjuvanted Rash and Headache      Immunizations:     Immunization History   Administered Date(s) Administered    INFLUENZA 12/03/2012, 11/19/2013, 10/15/2014, 11/21/2016, 11/02/2018, 11/06/2021    Influenza Quadrivalent Preservative Free 3 years and older IM 10/23/2017    Pneumococcal Conjugate 13-Valent 11/13/2018    Pneumococcal Polysaccharide PPV23 08/27/2020    Zoster Vaccine Recombinant 11/03/2020    influenza, trivalent, adjuvanted 11/04/2019      Health Maintenance:         Topic Date Due    Colorectal Cancer Screening  Never done    Hepatitis C Screening  Completed         Topic Date Due    COVID-19 Vaccine (1) Never done    DTaP,Tdap,and Td Vaccines (1 - Tdap) Never done      Medicare Health Risk Assessment:     /80 (BP Location: Right arm, Patient Position: Sitting, Cuff Size: Adult)   Pulse 75   Temp (!) 96 8 °F (36 °C) (Tympanic)   Resp 16   Ht 5' 6 75" (1 695 m)   Wt 86 2 kg (190 lb)   SpO2 95%   BMI 29 98 kg/m²      Aleyda Horne is here for her Subsequent Wellness visit  Health Risk Assessment:   Patient rates overall health as good  Patient feels that their physical health rating is same  Patient is satisfied with their life  Eyesight was rated as same  Hearing was rated as same  Patient feels that their emotional and mental health rating is slightly worse  Patients states they are never, rarely angry  Patient states they are never, rarely unusually tired/fatigued  Pain experienced in the last 7 days has been none  Patient states that she has experienced no weight loss or gain in last 6 months  Depression Screening:   PHQ-2 Score: 0      Fall Risk Screening: In the past year, patient has experienced: no history of falling in past year      Urinary Incontinence Screening:   Patient has not leaked urine accidently in the last six months  Home Safety:  Patient does not have trouble with stairs inside or outside of their home  Patient has working smoke alarms and has working carbon monoxide detector  Home safety hazards include: none  Nutrition:   Current diet is Regular  Medications:   Patient is currently taking over-the-counter supplements  OTC medications include: see medication list  Patient is able to manage medications  Activities of Daily Living (ADLs)/Instrumental Activities of Daily Living (IADLs):   Walk and transfer into and out of bed and chair?: Yes  Dress and groom yourself?: Yes    Bathe or shower yourself?: Yes    Feed yourself?  Yes  Do your laundry/housekeeping?: Yes  Manage your money, pay your bills and track your expenses?: Yes  Make your own meals?: Yes    Do your own shopping?: Yes    Previous Hospitalizations:   Any hospitalizations or ED visits within the last 12 months?: No      Advance Care Planning:   Living will: Yes    Durable POA for healthcare: No    Advanced directive: Yes      Cognitive Screening:   Provider or family/friend/caregiver concerned regarding cognition?: No    PREVENTIVE SCREENINGS      Cardiovascular Screening:    General: Screening Not Indicated, History Lipid Disorder and Risks and Benefits Discussed    Due for: Lipid Panel      Diabetes Screening:     General: Screening Current and Risks and Benefits Discussed    Due for: Blood Glucose      Colorectal Cancer Screening:     General: Risks and Benefits Discussed    Due for: Colonoscopy - Low Risk      Breast Cancer Screening:     General: Risks and Benefits Discussed    Due for: Mammogram        Cervical Cancer Screening:    General: Screening Not Indicated and Risks and Benefits Discussed      Osteoporosis Screening:    General: Risks and Benefits Discussed      Abdominal Aortic Aneurysm (AAA) Screening:        General: Risks and Benefits Discussed and Screening Not Indicated      Lung Cancer Screening:     General: Screening Not Indicated and Risks and Benefits Discussed      Hepatitis C Screening:    General: Screening Current and Risks and Benefits Discussed    Other Counseling Topics:   Regular weightbearing exercise         Sena Yusuf DO

## 2022-02-21 NOTE — PROGRESS NOTES
Assessment/Plan: previous labs and records reviewed  Patient go for multiple laboratory studies given right facial numbness as well as upper extremity numbness  Patient will go for MRI of the brain to rule out other structural etiologies for this weakness of the face as well as numbness  The patient will see Neurology  Patient wishes to follow-up in 6 months or as needed       Diagnoses and all orders for this visit:    Right facial numbness  -     Comprehensive metabolic panel; Future  -     CBC and differential; Future  -     Lipid panel; Future  -     TSH, 3rd generation with Free T4 reflex; Future  -     Vitamin B12; Future  -     C-reactive protein; Future  -     Protein electrophoresis, serum; Future  -     Lyme Antibody Profile with reflex to WB; Future  -     ALEX Screen w/ Reflex to Titer/Pattern; Future  -     MRI brain w wo contrast; Future  -     Magnesium; Future    GERD without esophagitis  -     omeprazole (PriLOSEC) 20 mg delayed release capsule; Take 1 capsule (20 mg total) by mouth daily    Medicare annual wellness visit, subsequent    Screening for cardiovascular condition  -     Lipid panel; Future    Encounter for screening mammogram for breast cancer  -     Mammo screening bilateral w 3d & cad; Future    Impaired fasting glucose  -     Comprehensive metabolic panel; Future    Mixed hyperlipidemia  -     Lipid panel; Future    Paresthesias  -     Comprehensive metabolic panel; Future  -     CBC and differential; Future  -     Lipid panel; Future  -     TSH, 3rd generation with Free T4 reflex; Future  -     Vitamin B12; Future  -     C-reactive protein; Future  -     Protein electrophoresis, serum; Future  -     Lyme Antibody Profile with reflex to WB; Future  -     ALEX Screen w/ Reflex to Titer/Pattern; Future  -     MRI brain w wo contrast; Future    Left upper extremity numbness  -     Comprehensive metabolic panel; Future  -     CBC and differential; Future  -     Lipid panel;  Future  - TSH, 3rd generation with Free T4 reflex; Future  -     Vitamin B12; Future  -     C-reactive protein; Future  -     Protein electrophoresis, serum; Future  -     Lyme Antibody Profile with reflex to WB; Future  -     ALEX Screen w/ Reflex to Titer/Pattern; Future  -     MRI brain w wo contrast; Future            Subjective:        Patient ID: Jay Jay Thomas is a 74 y o  female  Patient is here to follow-up on ongoing facial weakness on the right side of her face along with numbness right face and upper extremities bilaterally  Patient also here to follow-up on GERD, impaired fasting glucose, hyperlipidemia  No new chest pain shortness of breath or abdominal pain or problems urinating or defecating  Patient did see Plastic surgery and considering surgical procedure for right face weakness  Patient with history of Bell's palsy in the distant past   Numbness of the face and arms are new  Patient wishes to see Neurology also  Patient wishes other workup at this time  The following portions of the patient's history were reviewed and updated as appropriate: allergies, current medications, past family history, past medical history, past social history, past surgical history and problem list       Review of Systems   Constitutional: Negative  HENT: Negative  Eyes: Negative  Respiratory: Negative  Cardiovascular: Negative  Gastrointestinal: Negative  Endocrine: Negative  Genitourinary: Negative  Musculoskeletal: Negative  Skin: Negative  Allergic/Immunologic: Negative  Neurological: Positive for facial asymmetry, weakness and numbness  Hematological: Negative  Psychiatric/Behavioral: Negative  Objective:      BMI Counseling: Body mass index is 29 98 kg/m²  The BMI is above normal  Nutrition recommendations include decreasing portion sizes  Exercise recommendations include moderate physical activity 150 minutes/week   Rationale for BMI follow-up plan is due to patient being overweight or obese  Depression Screening and Follow-up Plan: Patient was screened for depression during today's encounter  They screened negative with a PHQ-2 score of 0             /80 (BP Location: Right arm, Patient Position: Sitting, Cuff Size: Adult)   Pulse 75   Temp (!) 96 8 °F (36 °C) (Tympanic)   Resp 16   Ht 5' 6 75" (1 695 m)   Wt 86 2 kg (190 lb)   SpO2 95%   BMI 29 98 kg/m²          Physical Exam  Vitals and nursing note reviewed  Constitutional:       General: She is not in acute distress  Appearance: Normal appearance  She is not ill-appearing, toxic-appearing or diaphoretic  HENT:      Head: Normocephalic and atraumatic  Right Ear: Tympanic membrane, ear canal and external ear normal  There is no impacted cerumen  Left Ear: Tympanic membrane, ear canal and external ear normal  There is no impacted cerumen  Nose: Nose normal  No congestion or rhinorrhea  Mouth/Throat:      Mouth: Mucous membranes are moist       Pharynx: No oropharyngeal exudate or posterior oropharyngeal erythema  Eyes:      General: No scleral icterus  Right eye: No discharge  Left eye: No discharge  Extraocular Movements: Extraocular movements intact  Conjunctiva/sclera: Conjunctivae normal       Pupils: Pupils are equal, round, and reactive to light  Neck:      Vascular: No carotid bruit  Cardiovascular:      Rate and Rhythm: Normal rate and regular rhythm  Pulses: Normal pulses  Heart sounds: Normal heart sounds  No murmur heard  No friction rub  No gallop  Pulmonary:      Effort: Pulmonary effort is normal  No respiratory distress  Breath sounds: Normal breath sounds  No stridor  No wheezing, rhonchi or rales  Chest:      Chest wall: No tenderness  Musculoskeletal:         General: Deformity and signs of injury present  No swelling or tenderness  Cervical back: Normal range of motion and neck supple   No rigidity  No muscular tenderness  Right lower leg: No edema  Left lower leg: No edema  Comments: Right facial weakness   Lymphadenopathy:      Cervical: No cervical adenopathy  Skin:     General: Skin is warm and dry  Capillary Refill: Capillary refill takes less than 2 seconds  Coloration: Skin is not jaundiced  Findings: No bruising, erythema, lesion or rash  Neurological:      Mental Status: She is alert and oriented to person, place, and time  Mental status is at baseline  Cranial Nerves: Cranial nerve deficit present  Sensory: Sensory deficit present  Motor: Weakness present  Coordination: Coordination normal       Gait: Gait normal    Psychiatric:         Mood and Affect: Mood normal          Behavior: Behavior normal          Thought Content:  Thought content normal          Judgment: Judgment normal

## 2022-02-21 NOTE — PATIENT INSTRUCTIONS
Medicare Preventive Visit Patient Instructions  Thank you for completing your Welcome to Medicare Visit or Medicare Annual Wellness Visit today  Your next wellness visit will be due in one year (2/22/2023)  The screening/preventive services that you may require over the next 5-10 years are detailed below  Some tests may not apply to you based off risk factors and/or age  Screening tests ordered at today's visit but not completed yet may show as past due  Also, please note that scanned in results may not display below  Preventive Screenings:  Service Recommendations Previous Testing/Comments   Colorectal Cancer Screening  * Colonoscopy    * Fecal Occult Blood Test (FOBT)/Fecal Immunochemical Test (FIT)  * Fecal DNA/Cologuard Test  * Flexible Sigmoidoscopy Age: 54-65 years old   Colonoscopy: every 10 years (may be performed more frequently if at higher risk)  OR  FOBT/FIT: every 1 year  OR  Cologuard: every 3 years  OR  Sigmoidoscopy: every 5 years  Screening may be recommended earlier than age 48 if at higher risk for colorectal cancer  Also, an individualized decision between you and your healthcare provider will decide whether screening between the ages of 74-80 would be appropriate  Colonoscopy: Not on file  FOBT/FIT: Not on file  Cologuard: Not on file  Sigmoidoscopy: Not on file          Breast Cancer Screening Age: 36 years old  Frequency: every 1-2 years  Not required if history of left and right mastectomy Mammogram: Not on file        Cervical Cancer Screening Between the ages of 21-29, pap smear recommended once every 3 years  Between the ages of 33-67, can perform pap smear with HPV co-testing every 5 years     Recommendations may differ for women with a history of total hysterectomy, cervical cancer, or abnormal pap smears in past  Pap Smear: Not on file    Screening Not Indicated   Hepatitis C Screening Once for adults born between [facility]  More frequently in patients at high risk for Hepatitis C Hep C Antibody: 11/15/2018    Screening Current   Diabetes Screening 1-2 times per year if you're at risk for diabetes or have pre-diabetes Fasting glucose: 101 mg/dL   A1C: 5 7 %    Screening Current   Cholesterol Screening Once every 5 years if you don't have a lipid disorder  May order more often based on risk factors  Lipid panel: 11/03/2020    Screening Not Indicated  History Lipid Disorder     Other Preventive Screenings Covered by Medicare:  1  Abdominal Aortic Aneurysm (AAA) Screening: covered once if your at risk  You're considered to be at risk if you have a family history of AAA  2  Lung Cancer Screening: covers low dose CT scan once per year if you meet all of the following conditions: (1) Age 50-69; (2) No signs or symptoms of lung cancer; (3) Current smoker or have quit smoking within the last 15 years; (4) You have a tobacco smoking history of at least 30 pack years (packs per day multiplied by number of years you smoked); (5) You get a written order from a healthcare provider  3  Glaucoma Screening: covered annually if you're considered high risk: (1) You have diabetes OR (2) Family history of glaucoma OR (3)  aged 48 and older OR (3)  American aged 72 and older  3  Osteoporosis Screening: covered every 2 years if you meet one of the following conditions: (1) You're estrogen deficient and at risk for osteoporosis based off medical history and other findings; (2) Have a vertebral abnormality; (3) On glucocorticoid therapy for more than 3 months; (4) Have primary hyperparathyroidism; (5) On osteoporosis medications and need to assess response to drug therapy  · Last bone density test (DXA Scan): Not on file  5  HIV Screening: covered annually if you're between the age of 12-76  Also covered annually if you are younger than 13 and older than 72 with risk factors for HIV infection   For pregnant patients, it is covered up to 3 times per pregnancy  Immunizations:  Immunization Recommendations   Influenza Vaccine Annual influenza vaccination during flu season is recommended for all persons aged >= 6 months who do not have contraindications   Pneumococcal Vaccine (Prevnar and Pneumovax)  * Prevnar = PCV13  * Pneumovax = PPSV23   Adults 25-60 years old: 1-3 doses may be recommended based on certain risk factors  Adults 72 years old: Prevnar (PCV13) vaccine recommended followed by Pneumovax (PPSV23) vaccine  If already received PPSV23 since turning 65, then PCV13 recommended at least one year after PPSV23 dose  Hepatitis B Vaccine 3 dose series if at intermediate or high risk (ex: diabetes, end stage renal disease, liver disease)   Tetanus (Td) Vaccine - COST NOT COVERED BY MEDICARE PART B Following completion of primary series, a booster dose should be given every 10 years to maintain immunity against tetanus  Td may also be given as tetanus wound prophylaxis  Tdap Vaccine - COST NOT COVERED BY MEDICARE PART B Recommended at least once for all adults  For pregnant patients, recommended with each pregnancy  Shingles Vaccine (Shingrix) - COST NOT COVERED BY MEDICARE PART B  2 shot series recommended in those aged 48 and above     Health Maintenance Due:      Topic Date Due    Colorectal Cancer Screening  Never done    Hepatitis C Screening  Completed     Immunizations Due:      Topic Date Due    COVID-19 Vaccine (1) Never done    DTaP,Tdap,and Td Vaccines (1 - Tdap) Never done     Advance Directives   What are advance directives? Advance directives are legal documents that state your wishes and plans for medical care  These plans are made ahead of time in case you lose your ability to make decisions for yourself  Advance directives can apply to any medical decision, such as the treatments you want, and if you want to donate organs  What are the types of advance directives?   There are many types of advance directives, and each state has rules about how to use them  You may choose a combination of any of the following:  · Living will: This is a written record of the treatment you want  You can also choose which treatments you do not want, which to limit, and which to stop at a certain time  This includes surgery, medicine, IV fluid, and tube feedings  · Durable power of  for healthcare [facility]): This is a written record that states who you want to make healthcare choices for you when you are unable to make them for yourself  This person, called a proxy, is usually a family member or a friend  You may choose more than 1 proxy  · Do not resuscitate (DNR) order:  A DNR order is used in case your heart stops beating or you stop breathing  It is a request not to have certain forms of treatment, such as CPR  A DNR order may be included in other types of advance directives  · Medical directive: This covers the care that you want if you are in a coma, near death, or unable to make decisions for yourself  You can list the treatments you want for each condition  Treatment may include pain medicine, surgery, blood transfusions, dialysis, IV or tube feedings, and a ventilator (breathing machine)  · Values history: This document has questions about your views, beliefs, and how you feel and think about life  This information can help others choose the care that you would choose  Why are advance directives important? An advance directive helps you control your care  Although spoken wishes may be used, it is better to have your wishes written down  Spoken wishes can be misunderstood, or not followed  Treatments may be given even if you do not want them  An advance directive may make it easier for your family to make difficult choices about your care     Weight Management   Why it is important to manage your weight:  Being overweight increases your risk of health conditions such as heart disease, high blood pressure, type 2 diabetes, and certain types of cancer  It can also increase your risk for osteoarthritis, sleep apnea, and other respiratory problems  Aim for a slow, steady weight loss  Even a small amount of weight loss can lower your risk of health problems  How to lose weight safely:  A safe and healthy way to lose weight is to eat fewer calories and get regular exercise  You can lose up about 1 pound a week by decreasing the number of calories you eat by 500 calories each day  Healthy meal plan for weight management:  A healthy meal plan includes a variety of foods, contains fewer calories, and helps you stay healthy  A healthy meal plan includes the following:  · Eat whole-grain foods more often  A healthy meal plan should contain fiber  Fiber is the part of grains, fruits, and vegetables that is not broken down by your body  Whole-grain foods are healthy and provide extra fiber in your diet  Some examples of whole-grain foods are whole-wheat breads and pastas, oatmeal, brown rice, and bulgur  · Eat a variety of vegetables every day  Include dark, leafy greens such as spinach, kale, [place] greens, and mustard greens  Eat yellow and orange vegetables such as carrots, sweet potatoes, and winter squash  · Eat a variety of fruits every day  Choose fresh or canned fruit (canned in its own juice or light syrup) instead of juice  Fruit juice has very little or no fiber  · Eat low-fat dairy foods  Drink fat-free (skim) milk or 1% milk  Eat fat-free yogurt and low-fat cottage cheese  Try low-fat cheeses such as mozzarella and other reduced-fat cheeses  · Choose meat and other protein foods that are low in fat  Choose beans or other legumes such as split peas or lentils  Choose fish, skinless poultry (chicken or turkey), or lean cuts of red meat (beef or pork)  Before you cook meat or poultry, cut off any visible fat  · Use less fat and oil  Try baking foods instead of frying them   Add less fat, such as margarine, sour cream, regular salad dressing and mayonnaise to foods  Eat fewer high-fat foods  Some examples of high-fat foods include french fries, doughnuts, ice cream, and cakes  · Eat fewer sweets  Limit foods and drinks that are high in sugar  This includes candy, cookies, regular soda, and sweetened drinks  Exercise:  Exercise at least 30 minutes per day on most days of the week  Some examples of exercise include walking, biking, dancing, and swimming  You can also fit in more physical activity by taking the stairs instead of the elevator or parking farther away from stores  Ask your healthcare provider about the best exercise plan for you  © Copyright [employer] 2018 Information is for End User's use only and may not be sold, redistributed or otherwise used for commercial purposes   All illustrations and images included in CareNotes® are the copyrighted property of [employer] M [employer]  or [address] [place] [address] [facility]

## 2022-02-24 ENCOUNTER — LAB (OUTPATIENT)
Dept: LAB | Age: 67
End: 2022-02-24
Payer: MEDICARE

## 2022-02-24 DIAGNOSIS — R20.0 LEFT UPPER EXTREMITY NUMBNESS: ICD-10-CM

## 2022-02-24 DIAGNOSIS — R73.01 IMPAIRED FASTING GLUCOSE: ICD-10-CM

## 2022-02-24 DIAGNOSIS — R20.0 RIGHT FACIAL NUMBNESS: ICD-10-CM

## 2022-02-24 DIAGNOSIS — R20.2 PARESTHESIAS: ICD-10-CM

## 2022-02-24 DIAGNOSIS — E78.2 MIXED HYPERLIPIDEMIA: ICD-10-CM

## 2022-02-24 LAB
ALBUMIN SERPL BCP-MCNC: 4 G/DL (ref 3.5–5)
ALP SERPL-CCNC: 107 U/L (ref 46–116)
ALT SERPL W P-5'-P-CCNC: 29 U/L (ref 12–78)
ANION GAP SERPL CALCULATED.3IONS-SCNC: 3 MMOL/L (ref 4–13)
AST SERPL W P-5'-P-CCNC: 17 U/L (ref 5–45)
BASOPHILS # BLD AUTO: 0.05 THOUSANDS/ΜL (ref 0–0.1)
BASOPHILS NFR BLD AUTO: 1 % (ref 0–1)
BILIRUB SERPL-MCNC: 0.56 MG/DL (ref 0.2–1)
BUN SERPL-MCNC: 19 MG/DL (ref 5–25)
CALCIUM SERPL-MCNC: 9.4 MG/DL (ref 8.3–10.1)
CHLORIDE SERPL-SCNC: 109 MMOL/L (ref 100–108)
CHOLEST SERPL-MCNC: 289 MG/DL
CO2 SERPL-SCNC: 29 MMOL/L (ref 21–32)
CREAT SERPL-MCNC: 1.05 MG/DL (ref 0.6–1.3)
CRP SERPL QL: <3 MG/L
EOSINOPHIL # BLD AUTO: 0.16 THOUSAND/ΜL (ref 0–0.61)
EOSINOPHIL NFR BLD AUTO: 2 % (ref 0–6)
ERYTHROCYTE [DISTWIDTH] IN BLOOD BY AUTOMATED COUNT: 12.6 % (ref 11.6–15.1)
GFR SERPL CREATININE-BSD FRML MDRD: 54 ML/MIN/1.73SQ M
GLUCOSE P FAST SERPL-MCNC: 96 MG/DL (ref 65–99)
HCT VFR BLD AUTO: 47.1 % (ref 34.8–46.1)
HDLC SERPL-MCNC: 58 MG/DL
HGB BLD-MCNC: 15.4 G/DL (ref 11.5–15.4)
IMM GRANULOCYTES # BLD AUTO: 0.02 THOUSAND/UL (ref 0–0.2)
IMM GRANULOCYTES NFR BLD AUTO: 0 % (ref 0–2)
LDLC SERPL CALC-MCNC: 218 MG/DL (ref 0–100)
LYMPHOCYTES # BLD AUTO: 2.35 THOUSANDS/ΜL (ref 0.6–4.47)
LYMPHOCYTES NFR BLD AUTO: 32 % (ref 14–44)
MAGNESIUM SERPL-MCNC: 2.4 MG/DL (ref 1.6–2.6)
MCH RBC QN AUTO: 30 PG (ref 26.8–34.3)
MCHC RBC AUTO-ENTMCNC: 32.7 G/DL (ref 31.4–37.4)
MCV RBC AUTO: 92 FL (ref 82–98)
MONOCYTES # BLD AUTO: 0.38 THOUSAND/ΜL (ref 0.17–1.22)
MONOCYTES NFR BLD AUTO: 5 % (ref 4–12)
NEUTROPHILS # BLD AUTO: 4.32 THOUSANDS/ΜL (ref 1.85–7.62)
NEUTS SEG NFR BLD AUTO: 60 % (ref 43–75)
NONHDLC SERPL-MCNC: 231 MG/DL
NRBC BLD AUTO-RTO: 0 /100 WBCS
PLATELET # BLD AUTO: 238 THOUSANDS/UL (ref 149–390)
PMV BLD AUTO: 10.2 FL (ref 8.9–12.7)
POTASSIUM SERPL-SCNC: 4.3 MMOL/L (ref 3.5–5.3)
PROT SERPL-MCNC: 7.8 G/DL (ref 6.4–8.2)
RBC # BLD AUTO: 5.13 MILLION/UL (ref 3.81–5.12)
SODIUM SERPL-SCNC: 141 MMOL/L (ref 136–145)
TRIGL SERPL-MCNC: 67 MG/DL
TSH SERPL DL<=0.05 MIU/L-ACNC: 2.2 UIU/ML (ref 0.36–3.74)
VIT B12 SERPL-MCNC: 1166 PG/ML (ref 100–900)
WBC # BLD AUTO: 7.28 THOUSAND/UL (ref 4.31–10.16)

## 2022-02-24 PROCEDURE — 85025 COMPLETE CBC W/AUTO DIFF WBC: CPT

## 2022-02-24 PROCEDURE — 83735 ASSAY OF MAGNESIUM: CPT

## 2022-02-24 PROCEDURE — 80053 COMPREHEN METABOLIC PANEL: CPT

## 2022-02-24 PROCEDURE — 84165 PROTEIN E-PHORESIS SERUM: CPT | Performed by: PATHOLOGY

## 2022-02-24 PROCEDURE — 80061 LIPID PANEL: CPT

## 2022-02-24 PROCEDURE — 82607 VITAMIN B-12: CPT

## 2022-02-24 PROCEDURE — 86038 ANTINUCLEAR ANTIBODIES: CPT

## 2022-02-24 PROCEDURE — 84165 PROTEIN E-PHORESIS SERUM: CPT

## 2022-02-24 PROCEDURE — 84443 ASSAY THYROID STIM HORMONE: CPT

## 2022-02-24 PROCEDURE — 36415 COLL VENOUS BLD VENIPUNCTURE: CPT

## 2022-02-24 PROCEDURE — 86618 LYME DISEASE ANTIBODY: CPT

## 2022-02-24 PROCEDURE — 86140 C-REACTIVE PROTEIN: CPT

## 2022-02-25 LAB — RYE IGE QN: NEGATIVE

## 2022-02-26 LAB
ALBUMIN SERPL ELPH-MCNC: 4.61 G/DL (ref 3.5–5)
ALBUMIN SERPL ELPH-MCNC: 65.9 % (ref 52–65)
ALPHA1 GLOB SERPL ELPH-MCNC: 0.32 G/DL (ref 0.1–0.4)
ALPHA1 GLOB SERPL ELPH-MCNC: 4.6 % (ref 2.5–5)
ALPHA2 GLOB SERPL ELPH-MCNC: 0.73 G/DL (ref 0.4–1.2)
ALPHA2 GLOB SERPL ELPH-MCNC: 10.4 % (ref 7–13)
B BURGDOR IGG+IGM SER-ACNC: -20
BETA GLOB ABNORMAL SERPL ELPH-MCNC: 0.37 G/DL (ref 0.4–0.8)
BETA1 GLOB SERPL ELPH-MCNC: 5.3 % (ref 5–13)
BETA2 GLOB SERPL ELPH-MCNC: 3.9 % (ref 2–8)
BETA2+GAMMA GLOB SERPL ELPH-MCNC: 0.27 G/DL (ref 0.2–0.5)
GAMMA GLOB ABNORMAL SERPL ELPH-MCNC: 0.69 G/DL (ref 0.5–1.6)
GAMMA GLOB SERPL ELPH-MCNC: 9.9 % (ref 12–22)
IGG/ALB SER: 1.93 {RATIO} (ref 1.1–1.8)
PROT PATTERN SERPL ELPH-IMP: ABNORMAL
PROT SERPL-MCNC: 7 G/DL (ref 6.4–8.2)

## 2022-02-28 ENCOUNTER — TELEPHONE (OUTPATIENT)
Dept: FAMILY MEDICINE CLINIC | Facility: CLINIC | Age: 67
End: 2022-02-28

## 2022-03-03 ENCOUNTER — OFFICE VISIT (OUTPATIENT)
Dept: FAMILY MEDICINE CLINIC | Facility: CLINIC | Age: 67
End: 2022-03-03
Payer: MEDICARE

## 2022-03-03 VITALS
TEMPERATURE: 97.6 F | SYSTOLIC BLOOD PRESSURE: 128 MMHG | HEIGHT: 67 IN | DIASTOLIC BLOOD PRESSURE: 88 MMHG | OXYGEN SATURATION: 99 % | HEART RATE: 72 BPM | BODY MASS INDEX: 29.98 KG/M2

## 2022-03-03 DIAGNOSIS — Z01.818 PREOP EXAMINATION: Primary | ICD-10-CM

## 2022-03-03 DIAGNOSIS — H02.401 PTOSIS OF RIGHT EYELID: ICD-10-CM

## 2022-03-03 DIAGNOSIS — E78.2 MIXED HYPERLIPIDEMIA: ICD-10-CM

## 2022-03-03 DIAGNOSIS — G51.0 BELL'S PALSY: ICD-10-CM

## 2022-03-03 PROCEDURE — 99214 OFFICE O/P EST MOD 30 MIN: CPT | Performed by: FAMILY MEDICINE

## 2022-03-03 NOTE — PROGRESS NOTES
Assessment/Plan:  Patient at low risk for cardiopulmonary event  Okay to proceed with surgery  Patient had normal EKG as well as normal stress test last year  No new cardiac symptomatology  Patient very active  Guidance given  Patient will stop aspirin 1 week prior to surgery  Will discussed about starting cholesterol-lowering agent in the future  Labs reviewed  Diagnoses and all orders for this visit:    Preop examination    Ptosis of right eyelid    Bell's palsy    Mixed hyperlipidemia            Subjective:        Patient ID: Adelia Higgins is a 74 y o  female  Patient is here for preop clearance for right eye upper lid ptosis with decreased vision  Patient will be having surgery on March 14th  Patient will be having repair of the ptosis as well as face lift bilaterally for symmetry  Patient able to go up and down stairs without any chest pain shortness breath headache palpitations dizziness or syncope  No new URI symptoms or fevers or chills  No problems with urination or defecation  Patient has had normal EKG in the emergency room last year as well as normal stress test done in May of 2021  The following portions of the patient's history were reviewed and updated as appropriate: allergies, current medications, past family history, past medical history, past social history, past surgical history and problem list       Review of Systems   Constitutional: Negative  HENT:        Ptosis right upper eyelid   Eyes: Positive for visual disturbance  Respiratory: Negative  Cardiovascular: Negative  Gastrointestinal: Negative  Endocrine: Negative  Genitourinary: Negative  Musculoskeletal: Negative  Skin: Negative  Allergic/Immunologic: Negative  Neurological: Negative  Hematological: Negative  Psychiatric/Behavioral: Negative  Objective:      BMI Counseling: Body mass index is 29 98 kg/m²   The BMI is above normal  Nutrition recommendations include decreasing portion sizes  Exercise recommendations include moderate physical activity 150 minutes/week  Rationale for BMI follow-up plan is due to patient being overweight or obese  Depression Screening and Follow-up Plan: Clincally patient does not have depression  No treatment is required  /88 (BP Location: Right arm, Patient Position: Sitting, Cuff Size: Standard)   Pulse 72   Temp 97 6 °F (36 4 °C) (Tympanic)   Ht 5' 6 75" (1 695 m)   SpO2 99%   BMI 29 98 kg/m²          Physical Exam  Vitals and nursing note reviewed  Constitutional:       General: She is not in acute distress  Appearance: Normal appearance  She is not ill-appearing, toxic-appearing or diaphoretic  HENT:      Head: Normocephalic and atraumatic  Right Ear: Tympanic membrane, ear canal and external ear normal  There is no impacted cerumen  Left Ear: Tympanic membrane, ear canal and external ear normal  There is no impacted cerumen  Nose: Nose normal  No congestion or rhinorrhea  Mouth/Throat:      Mouth: Mucous membranes are moist       Pharynx: No oropharyngeal exudate or posterior oropharyngeal erythema  Eyes:      General: No scleral icterus  Right eye: No discharge  Left eye: No discharge  Extraocular Movements: Extraocular movements intact  Conjunctiva/sclera: Conjunctivae normal       Pupils: Pupils are equal, round, and reactive to light  Comments: Ptosis right upper eyelid   Neck:      Vascular: No carotid bruit  Cardiovascular:      Rate and Rhythm: Normal rate and regular rhythm  Pulses: Normal pulses  Heart sounds: Normal heart sounds  No murmur heard  No friction rub  No gallop  Pulmonary:      Effort: Pulmonary effort is normal  No respiratory distress  Breath sounds: Normal breath sounds  No stridor  No wheezing, rhonchi or rales  Chest:      Chest wall: No tenderness  Abdominal:      General: Abdomen is flat   Bowel sounds are normal  There is no distension  Palpations: Abdomen is soft  Tenderness: There is no abdominal tenderness  There is no guarding or rebound  Musculoskeletal:         General: No swelling, tenderness, deformity or signs of injury  Normal range of motion  Cervical back: Normal range of motion and neck supple  No rigidity  No muscular tenderness  Right lower leg: No edema  Left lower leg: No edema  Lymphadenopathy:      Cervical: No cervical adenopathy  Skin:     General: Skin is warm and dry  Capillary Refill: Capillary refill takes less than 2 seconds  Coloration: Skin is not jaundiced  Findings: No bruising, erythema, lesion or rash  Neurological:      Mental Status: She is alert and oriented to person, place, and time  Mental status is at baseline  Cranial Nerves: No cranial nerve deficit  Sensory: No sensory deficit  Motor: Weakness present  Coordination: Coordination normal       Gait: Gait normal       Comments: Ptosis right upper eyelid   Psychiatric:         Mood and Affect: Mood normal          Behavior: Behavior normal          Thought Content:  Thought content normal          Judgment: Judgment normal

## 2022-03-07 ENCOUNTER — CLINICAL SUPPORT (OUTPATIENT)
Dept: FAMILY MEDICINE CLINIC | Facility: CLINIC | Age: 67
End: 2022-03-07
Payer: MEDICARE

## 2022-03-07 DIAGNOSIS — Z01.818 PRE-OP TESTING: Primary | ICD-10-CM

## 2022-03-07 PROCEDURE — 93000 ELECTROCARDIOGRAM COMPLETE: CPT

## 2022-04-05 ENCOUNTER — OFFICE VISIT (OUTPATIENT)
Dept: FAMILY MEDICINE CLINIC | Facility: CLINIC | Age: 67
End: 2022-04-05
Payer: MEDICARE

## 2022-04-05 VITALS
HEART RATE: 85 BPM | TEMPERATURE: 97.2 F | HEIGHT: 67 IN | BODY MASS INDEX: 29.54 KG/M2 | DIASTOLIC BLOOD PRESSURE: 80 MMHG | WEIGHT: 188.2 LBS | SYSTOLIC BLOOD PRESSURE: 124 MMHG | OXYGEN SATURATION: 95 %

## 2022-04-05 DIAGNOSIS — E78.2 MIXED HYPERLIPIDEMIA: Primary | ICD-10-CM

## 2022-04-05 DIAGNOSIS — N18.30 STAGE 3 CHRONIC KIDNEY DISEASE, UNSPECIFIED WHETHER STAGE 3A OR 3B CKD (HCC): ICD-10-CM

## 2022-04-05 PROCEDURE — 99213 OFFICE O/P EST LOW 20 MIN: CPT | Performed by: FAMILY MEDICINE

## 2022-04-05 NOTE — PROGRESS NOTES
Assessment/Plan:       Diagnoses and all orders for this visit:    Mixed hyperlipidemia  -     Ambulatory Referral to Cardiology; Future  -     Lipid panel; Future    Stage 3 chronic kidney disease, unspecified whether stage 3a or 3b CKD (HCC)            Subjective:        Patient ID: Laquita Morgan is a 74 y o  female  Patient here to follow-up on hyperlipidemia  No chest pain shortness of breath palpitations  No problems urinating or defecating  The following portions of the patient's history were reviewed and updated as appropriate: allergies, current medications, past family history, past medical history, past social history, past surgical history and problem list       Review of Systems   Constitutional: Negative  HENT: Negative  Eyes: Negative  Respiratory: Negative  Cardiovascular: Negative  Gastrointestinal: Negative  Endocrine: Negative  Genitourinary: Negative  Musculoskeletal: Negative  Skin: Negative  Allergic/Immunologic: Negative  Neurological: Negative  Hematological: Negative  Psychiatric/Behavioral: Negative  Objective:               /80 (BP Location: Right arm, Patient Position: Sitting, Cuff Size: Standard)   Pulse 85   Temp (!) 97 2 °F (36 2 °C) (Tympanic)   Ht 5' 6 5" (1 689 m)   Wt 85 4 kg (188 lb 3 2 oz)   SpO2 95%   BMI 29 92 kg/m²          Physical Exam  Vitals and nursing note reviewed  Constitutional:       Appearance: Normal appearance  Cardiovascular:      Rate and Rhythm: Normal rate and regular rhythm  Pulses: Normal pulses  Heart sounds: Normal heart sounds  Pulmonary:      Effort: Pulmonary effort is normal       Breath sounds: Normal breath sounds  Neurological:      Mental Status: She is alert

## 2022-08-24 ENCOUNTER — OFFICE VISIT (OUTPATIENT)
Dept: FAMILY MEDICINE CLINIC | Facility: CLINIC | Age: 67
End: 2022-08-24
Payer: MEDICARE

## 2022-08-24 VITALS
BODY MASS INDEX: 30.22 KG/M2 | HEIGHT: 66 IN | WEIGHT: 188 LBS | TEMPERATURE: 99 F | HEART RATE: 82 BPM | OXYGEN SATURATION: 95 % | DIASTOLIC BLOOD PRESSURE: 80 MMHG | RESPIRATION RATE: 18 BRPM | SYSTOLIC BLOOD PRESSURE: 126 MMHG

## 2022-08-24 DIAGNOSIS — K21.9 GERD WITHOUT ESOPHAGITIS: ICD-10-CM

## 2022-08-24 DIAGNOSIS — D17.24 LIPOMA OF LEFT LOWER EXTREMITY: ICD-10-CM

## 2022-08-24 DIAGNOSIS — H60.331 ACUTE SWIMMER'S EAR OF RIGHT SIDE: ICD-10-CM

## 2022-08-24 DIAGNOSIS — Z12.11 COLON CANCER SCREENING: Primary | ICD-10-CM

## 2022-08-24 PROCEDURE — 99214 OFFICE O/P EST MOD 30 MIN: CPT | Performed by: FAMILY MEDICINE

## 2022-08-24 RX ORDER — OMEPRAZOLE 20 MG/1
20 CAPSULE, DELAYED RELEASE ORAL DAILY
Qty: 90 CAPSULE | Refills: 1 | Status: SHIPPED | OUTPATIENT
Start: 2022-08-24

## 2022-08-24 RX ORDER — CIPROFLOXACIN AND DEXAMETHASONE 3; 1 MG/ML; MG/ML
4 SUSPENSION/ DROPS AURICULAR (OTIC) 2 TIMES DAILY
Qty: 7.5 ML | Refills: 0 | Status: SHIPPED | OUTPATIENT
Start: 2022-08-24 | End: 2022-08-24 | Stop reason: SDUPTHER

## 2022-08-24 RX ORDER — CIPROFLOXACIN AND DEXAMETHASONE 3; 1 MG/ML; MG/ML
4 SUSPENSION/ DROPS AURICULAR (OTIC) 2 TIMES DAILY
Qty: 7.5 ML | Refills: 0 | Status: SHIPPED | OUTPATIENT
Start: 2022-08-24

## 2022-08-24 NOTE — PROGRESS NOTES
Assessment/Plan:  Patient will observe left leg  Probable lipoma  Patient use Ciprodex for swimmer's ear  Patient will continue with current regimen for GERD  Patient will be referred to GI  Diagnoses and all orders for this visit:    Acute swimmer's ear of right side  -     Discontinue: ciprofloxacin-dexamethasone (CIPRODEX) otic suspension; Administer 4 drops to the right ear 2 (two) times a day  -     ciprofloxacin-dexamethasone (CIPRODEX) otic suspension; Administer 4 drops to the right ear 2 (two) times a day    Lipoma of left lower extremity    GERD without esophagitis  -     omeprazole (PriLOSEC) 20 mg delayed release capsule; Take 1 capsule (20 mg total) by mouth daily  -     Ambulatory Referral to Gastroenterology; Future            Subjective:        Patient ID: Lazara Bach is a 69 y o  female  Patient is here with lump left shin the past few months  Patient has noticed increase in size recently blood size did decrease previously  No redness or ecchymosis noted  No significant pain  No direct trauma noted  Right ear is clogged  This is been going on for the past  Few weeks  Patient with ongoing reflux symptoms  Patient needs refill on omeprazole  The following portions of the patient's history were reviewed and updated as appropriate: allergies, current medications, past family history, past medical history, past social history, past surgical history and problem list       Review of Systems   Constitutional: Negative  HENT: Positive for ear pain  Eyes: Negative  Respiratory: Negative  Cardiovascular: Negative  Gastrointestinal: Negative  Endocrine: Negative  Genitourinary: Negative  Musculoskeletal: Negative  Skin: Positive for color change  Allergic/Immunologic: Negative  Neurological: Negative  Hematological: Negative  Psychiatric/Behavioral: Negative              Objective:        Depression Screening and Follow-up Plan: Patient was screened for depression during today's encounter  They screened negative with a PHQ-2 score of 0             /80 (BP Location: Right arm, Patient Position: Sitting, Cuff Size: Standard)   Pulse 82   Temp 99 °F (37 2 °C)   Resp 18   Ht 5' 6" (1 676 m)   Wt 85 3 kg (188 lb)   SpO2 95%   BMI 30 34 kg/m²          Physical Exam  Vitals and nursing note reviewed  Constitutional:       General: She is not in acute distress  Appearance: Normal appearance  She is not ill-appearing, toxic-appearing or diaphoretic  HENT:      Head: Normocephalic and atraumatic  Right Ear: Tympanic membrane, ear canal and external ear normal  There is no impacted cerumen  Left Ear: Tympanic membrane, ear canal and external ear normal  There is no impacted cerumen  Nose: Nose normal  No congestion or rhinorrhea  Mouth/Throat:      Mouth: Mucous membranes are moist       Pharynx: No oropharyngeal exudate or posterior oropharyngeal erythema  Eyes:      General: No scleral icterus  Right eye: No discharge  Left eye: No discharge  Extraocular Movements: Extraocular movements intact  Conjunctiva/sclera: Conjunctivae normal       Pupils: Pupils are equal, round, and reactive to light  Neck:      Vascular: No carotid bruit  Cardiovascular:      Rate and Rhythm: Normal rate and regular rhythm  Pulses: Normal pulses  Heart sounds: Normal heart sounds  No murmur heard  No friction rub  No gallop  Pulmonary:      Effort: Pulmonary effort is normal  No respiratory distress  Breath sounds: Normal breath sounds  No stridor  No wheezing, rhonchi or rales  Chest:      Chest wall: No tenderness  Abdominal:      Tenderness: There is no rebound  Musculoskeletal:         General: No swelling, tenderness, deformity or signs of injury  Normal range of motion  Cervical back: Normal range of motion and neck supple  No rigidity  No muscular tenderness        Right lower leg: No edema  Left lower leg: No edema  Lymphadenopathy:      Cervical: No cervical adenopathy  Skin:     General: Skin is warm and dry  Capillary Refill: Capillary refill takes less than 2 seconds  Coloration: Skin is not jaundiced  Findings: No bruising, erythema, lesion or rash  Neurological:      Mental Status: She is alert and oriented to person, place, and time  Mental status is at baseline  Cranial Nerves: No cranial nerve deficit  Sensory: No sensory deficit  Motor: No weakness  Coordination: Coordination normal       Gait: Gait normal    Psychiatric:         Mood and Affect: Mood normal          Behavior: Behavior normal          Thought Content:  Thought content normal          Judgment: Judgment normal

## 2022-10-10 ENCOUNTER — TELEPHONE (OUTPATIENT)
Dept: OTHER | Facility: OTHER | Age: 67
End: 2022-10-10

## 2022-10-11 NOTE — TELEPHONE ENCOUNTER
Pt called, to cancel upcoming appointment since she is not feeling well   A call back was requested at best convenience to reschedule appointment

## 2022-10-12 PROBLEM — R05.9 COUGH: Status: RESOLVED | Noted: 2021-10-26 | Resolved: 2022-10-12

## 2022-10-23 PROBLEM — H60.331 ACUTE SWIMMER'S EAR OF RIGHT SIDE: Status: RESOLVED | Noted: 2022-08-24 | Resolved: 2022-10-23

## 2022-11-16 ENCOUNTER — CONSULT (OUTPATIENT)
Dept: GASTROENTEROLOGY | Facility: MEDICAL CENTER | Age: 67
End: 2022-11-16

## 2022-11-16 VITALS
BODY MASS INDEX: 30.3 KG/M2 | DIASTOLIC BLOOD PRESSURE: 72 MMHG | SYSTOLIC BLOOD PRESSURE: 126 MMHG | WEIGHT: 187.7 LBS | TEMPERATURE: 97.5 F | HEART RATE: 80 BPM

## 2022-11-16 DIAGNOSIS — K21.9 GERD WITHOUT ESOPHAGITIS: ICD-10-CM

## 2022-11-16 RX ORDER — PANTOPRAZOLE SODIUM 40 MG/1
40 TABLET, DELAYED RELEASE ORAL DAILY
Qty: 30 TABLET | Refills: 2 | Status: SHIPPED | OUTPATIENT
Start: 2022-11-16

## 2022-11-16 NOTE — PROGRESS NOTES
[facility] - Outpatient Consultation  Vivienne Spence 69 y o  female MRN: [id number]  Encounter: [id number]          ASSESSMENT AND PLAN:    1  GERD without esophagitis        69 y o  female w/ hx of GERD who is referred to GI for GERD  Patient has typical GERD symptoms which may have been exacerbated by weight gain 4 years ago  Symptoms respond very well to omeprazole, but she stopped taking this due to constipation and weight gain she attributes to the drug  H2RA also seems to give partial relief with milder constipation  Especially in the context of a normal exercise stress test, I think GERD explains these symptoms, and I gave her reassurance that this is unlikely to be cardiac in etiology  However, I did encourage her to see a cardiologist which she is already planning to do  In terms of symptom management, I suggested trying a different PPI to see if she has fewer side effects  Pantoprazole is theoretically less potent than omeprazole, and given that she says she is sensitive to many medications, this may be a good choice to try  Ultimately, if GERD is well-controlled on PPI, but she has issues with constipation, we can consider keeping the PPI while treating constipation with a laxative  I also recommended non-urgent EGD  She has no alarm symptoms but given her age > 59, she technically should have an EGD to rule out malignancy  She defers until after she sees her cardiologist     She says she had a normal colonoscopy 8 years ago at [facility] and is not due for another 2 years  - Pantoprazole 40 mg daily (30 min before breakfast)   If this helps but causes constipation, can try 20 mg daily which is a very low dose  - EGD deferred until after she sees her cardiologist  - Records of prior colonoscopy from [facility]    RTC in 3 months    ______________________________________________________________________    HPI:    Vivienne Spence is a 69 y o  female w/ hx of GERD who is referred to GI for GERD  Patient reports a knee injury about 4 years ago which resulted in decreased physical activity and mild weight gain  This seemed to be associated with the new onset of frequent heartburn marked by regurgitation and burning from the epigastrium to the chest   Symptoms worsened after meals and sometimes at nighttime  No clear food triggers, but symptoms are a bit better when fasting  She has tried omeprazole 20 mg daily with near resolution of heartburn, but she states that she is very sensitive to medications  Omeprazole caused constipation and weight gain, so she discontinued this medication  She has been taking Pepcid p r n  which helps somewhat and also causes milder constipation  Denies dysphagia, hematochezia, melena, or weight loss  Of note, patient has a significant family history of heart disease  She has noticed some dyspnea so was concerned about her heart  However, she has had a treadmill stress test in 05/2021 which was normal       REVIEW OF SYSTEMS:  As per HPI  Otherwise negative  Historical Information   Past Medical History:   Diagnosis Date   • Bell's palsy      Past Surgical History:   Procedure Laterality Date   • LIPOSUCTION      Thighs     Social History   Social History     Substance and Sexual Activity   Alcohol Use Yes     Social History     Substance and Sexual Activity   Drug Use No     Social History     Tobacco Use   Smoking Status Former   Smokeless Tobacco Never     History reviewed  No pertinent family history      Meds/Allergies       Current Outpatient Medications:   •  aspirin 81 mg chewable tablet  •  Multiple Vitamins-Minerals (MULTIVITAMIN GUMMIES ADULT PO)  •  pantoprazole (PROTONIX) 40 mg tablet  •  ciprofloxacin-dexamethasone (CIPRODEX) otic suspension    Allergies   Allergen Reactions   • Shellfish Allergy - Food Allergy Anaphylaxis     SHRIIMP   • Morphine And Related Nausea Only   • Bee Venom    • Codeine Nausea Only and Vomiting • Other      Action Taken: SHRIMP  ANESTHESIA "DEATHLY ILL"  NARCOTICS;    • Shrimp Extract Allergy Skin Test - Food Allergy    • Statins    • Zoster Vac Recomb Adjuvanted Rash and Headache           Objective     Blood pressure 126/72, pulse 80, temperature 97 5 °F (36 4 °C), weight 85 1 kg (187 lb 11 2 oz)  Body mass index is 30 3 kg/m²  PHYSICAL EXAM:      General: No acute distress  Abdomen: Soft, minimal epigastric tenderness, non-distended, normoactive bowel sounds  Neuro: Awake, alert, oriented x 3    Lab Results:   No visits with results within 1 Day(s) from this visit     Latest known visit with results is:   Lab on 02/24/2022   Component Date Value   • Sodium 02/24/2022 141    • Potassium 02/24/2022 4 3    • Chloride 02/24/2022 109 (H)    • CO2 02/24/2022 29    • ANION GAP 02/24/2022 3 (L)    • BUN 02/24/2022 19    • Creatinine 02/24/2022 1 05    • Glucose, Fasting 02/24/2022 96    • Calcium 02/24/2022 9 4    • AST 02/24/2022 17    • ALT 02/24/2022 29    • Alkaline Phosphatase 02/24/2022 107    • Total Protein 02/24/2022 7 8    • Albumin 02/24/2022 4 0    • Total Bilirubin 02/24/2022 0 56    • eGFR 02/24/2022 54    • WBC 02/24/2022 7 28    • RBC 02/24/2022 5 13 (H)    • Hemoglobin 02/24/2022 15 4    • Hematocrit 02/24/2022 47 1 (H)    • MCV 02/24/2022 92    • MCH 02/24/2022 30 0    • MCHC 02/24/2022 32 7    • RDW 02/24/2022 12 6    • MPV 02/24/2022 10 2    • Platelets 29/10/3880 238    • nRBC 02/24/2022 0    • Neutrophils Relative 02/24/2022 60    • Immat GRANS % 02/24/2022 0    • Lymphocytes Relative 02/24/2022 32    • Monocytes Relative 02/24/2022 5    • Eosinophils Relative 02/24/2022 2    • Basophils Relative 02/24/2022 1    • Neutrophils Absolute 02/24/2022 4 32    • Immature Grans Absolute 02/24/2022 0 02    • Lymphocytes Absolute 02/24/2022 2 35    • Monocytes Absolute 02/24/2022 0 38    • Eosinophils Absolute 02/24/2022 0 16    • Basophils Absolute 02/24/2022 0 05    • Cholesterol 02/24/2022 289 (H)    • Triglycerides 02/24/2022 67    • HDL, Direct 02/24/2022 58    • LDL Calculated 02/24/2022 218 (H)    • Non-HDL-Chol (CHOL-HDL) 02/24/2022 231    • TSH 3RD GENERATON 02/24/2022 2 200    • Vitamin B-12 02/24/2022 1,166 (H)    • CRP 02/24/2022 <3 0    • A/G Ratio 02/24/2022 1 93 (H)    • Albumin Electrophoresis 02/24/2022 65 9 (H)    • Albumin CONC 02/24/2022 4 61    • Alpha 1 02/24/2022 4 6    • ALPHA 1 CONC 02/24/2022 0 32    • Alpha 2 02/24/2022 10 4    • ALPHA 2 CONC 02/24/2022 0 73    • Beta-1 02/24/2022 5 3    • BETA 1 CONC 02/24/2022 0 37 (L)    • Beta-2 02/24/2022 3 9    • BETA 2 CONC 02/24/2022 0 27    • Gamma Globulin 02/24/2022 9 9 (L)    • GAMMA CONC 02/24/2022 0 69    • Total Protein 02/24/2022 7 0    • SPEP Interpretation 02/24/2022 See Comment    • Lyme total antibody 02/24/2022 -20    • AELX 02/24/2022 Negative    • Magnesium 02/24/2022 2 4

## 2023-01-10 ENCOUNTER — HOSPITAL ENCOUNTER (OUTPATIENT)
Dept: MAMMOGRAPHY | Facility: MEDICAL CENTER | Age: 68
Discharge: HOME | End: 2023-01-10

## 2023-01-10 VITALS — WEIGHT: 187 LBS | HEIGHT: 66 IN | BODY MASS INDEX: 30.05 KG/M2

## 2023-01-10 DIAGNOSIS — Z12.31 ENCOUNTER FOR SCREENING MAMMOGRAM FOR BREAST CANCER: ICD-10-CM

## 2023-02-07 ENCOUNTER — CONSULT (OUTPATIENT)
Dept: CARDIOLOGY CLINIC | Facility: CLINIC | Age: 68
End: 2023-02-07

## 2023-02-07 VITALS
DIASTOLIC BLOOD PRESSURE: 90 MMHG | WEIGHT: 189.2 LBS | HEART RATE: 68 BPM | HEIGHT: 66 IN | SYSTOLIC BLOOD PRESSURE: 130 MMHG | BODY MASS INDEX: 30.41 KG/M2

## 2023-02-07 DIAGNOSIS — E78.2 MIXED HYPERLIPIDEMIA: ICD-10-CM

## 2023-02-07 DIAGNOSIS — N18.30 STAGE 3 CHRONIC KIDNEY DISEASE, UNSPECIFIED WHETHER STAGE 3A OR 3B CKD (HCC): ICD-10-CM

## 2023-02-07 DIAGNOSIS — R07.9 CHEST PAIN, UNSPECIFIED TYPE: ICD-10-CM

## 2023-02-07 DIAGNOSIS — Z91.89 MULTIPLE RISK FACTORS FOR CORONARY ARTERY DISEASE: Primary | ICD-10-CM

## 2023-02-07 NOTE — PROGRESS NOTES
Outpatient Consultation - General Cardiology   Jerome Minor 69 y o  female   MRN: [id number]  Encounter: [id number]      PCP: Rose Mitchell DO      History of Present Illness   Physician Requesting Consult: Consults   Reason for Consult / Principal Problem: prior history of abnormal EKG, chest pain and positive risk factors for heart disease  HPI: I had the pleasure of meeting Jerome Minor is a 69y o  year old female, with a history of uncontrolled hyperlipidemia, GERD, CKD 3, and a former smoker who was referred to [facility] outpatient Cardiology by her PCP for evaluation of prior history of chest pain with abnormal EKG  Patient denies ongoing pain and recent chest pain  She had an episode in 2021 April where she was seen by her PCP, after an EKG, she was urgently sent to the ED for possible MI  In the ED however she was reassured and discharged home  She had a stress test in May 2021 which was negative  She is however concerned about this event as she has an extensive family history of CAD (3 brothers with CAD, mother, and maternal uncle with sudden cardiac death)  She also has difficult to control hyperlipidemia which appears to be familial in nature  She has occasional chest discomfort, retrosternal  She is active during summer with no reproduction of symptoms  Denies shortness of breath, palpitations, dizziness  No prior history of heart disease, she is a former smoker quit 30 years back, only occasional alcohol consumption  She was seen by GI recently for GERD for which she was started on PPI with resolution of symptoms  Review of Systems   Constitutional: Positive for fatigue  HENT: Negative  Eyes: Negative  Respiratory: Negative  Negative for chest tightness and shortness of breath  Cardiovascular: Negative  Negative for chest pain, palpitations and leg swelling  Gastrointestinal: Negative  Endocrine: Negative  Genitourinary: Negative      Musculoskeletal: Negative  Allergic/Immunologic: Negative  Neurological: Negative  Negative for dizziness and syncope  Psychiatric/Behavioral: Negative  All other systems reviewed and are negative  Review of system was conducted and was negative except for as stated in the HPI        Historical Information   Past Medical History:   Diagnosis Date   • Bell's palsy      Past Surgical History:   Procedure Laterality Date   • LIPOSUCTION      Thighs     Social History     Substance and Sexual Activity   Alcohol Use Yes     Social History     Substance and Sexual Activity   Drug Use No     Social History     Tobacco Use   Smoking Status Former   Smokeless Tobacco Never     Family History:   Family History   Problem Relation Age of Onset   • No Known Problems Mother    • No Known Problems Father    • No Known Problems Sister    • No Known Problems Sister    • No Known Problems Daughter    • No Known Problems Maternal Grandmother    • No Known Problems Maternal Grandfather    • No Known Problems Paternal Grandmother    • No Known Problems Paternal Grandfather    • No Known Problems Maternal Aunt    • No Known Problems Maternal Aunt    • No Known Problems Maternal Aunt    • No Known Problems Paternal Aunt    • No Known Problems Paternal Aunt    • No Known Problems Paternal Aunt    • No Known Problems Paternal Aunt    • No Known Problems Paternal Aunt    • No Known Problems Paternal Aunt        Meds/Allergies   Home Medications:   Current Outpatient Medications:   •  aspirin 81 mg chewable tablet, Chew 81 mg, Disp: , Rfl:   •  ciprofloxacin-dexamethasone (CIPRODEX) otic suspension, Administer 4 drops to the right ear 2 (two) times a day, Disp: 7 5 mL, Rfl: 0  •  Multiple Vitamins-Minerals (MULTIVITAMIN GUMMIES ADULT PO), Take by mouth, Disp: , Rfl:   •  pantoprazole (PROTONIX) 40 mg tablet, Take 1 tablet (40 mg total) by mouth daily, Disp: 30 tablet, Rfl: 2    Allergies   Allergen Reactions   • Shellfish Allergy - Food Allergy Anaphylaxis     SHRIIMP   • Morphine And Related Nausea Only   • Bee Venom    • Codeine Nausea Only and Vomiting   • Other      Action Taken: SHRIMP  ANESTHESIA "DEATHLY ILL"  NARCOTICS;    • Shrimp Extract Allergy Skin Test - Food Allergy    • Statins    • Zoster Vac Recomb Adjuvanted Rash and Headache         Objective   Vitals: There were no vitals taken for this visit  Physical Exam    GEN: Fili Barfield appears well, alert and oriented x 3, pleasant and cooperative   HEENT:  Normocephalic, atraumatic, anicteric, moist mucous membranes  NECK: No JVD or carotid bruits   HEART: regular rhythm, normal rate, normal S1 and S2, no murmurs, clicks, gallops or rubs   LUNGS: Clear to auscultation bilaterally; no wheezes, rales, or rhonchi; respiration nonlabored   ABDOMEN:  Normoactive bowel sounds, soft, no tenderness, no distention  EXTREMITIES: peripheral pulses palpable; no edema  NEURO: no gross focal findings; cranial nerves grossly intact   SKIN:  Dry, intact, warm to touch    Lab Results: CBC with diff:     CMP:       Invalid input(s): ALBUMIN  Lipid Profile:     No results found for: HSTNI0, HSTNI2, HSTNI4, HSTNI  No results found for: NTBNP  Lab Results   Component Value Date    CHOL 256 (H) 10/07/2016    TRIG 67 02/24/2022    HDL 58 02/24/2022    LDLCALC 218 (H) 02/24/2022     Lab Results   Component Value Date     10/07/2016    K 4 3 02/24/2022    CO2 29 02/24/2022     (H) 02/24/2022    BUN 19 02/24/2022    CREATININE 1 05 02/24/2022    ALT 29 02/24/2022    AST 17 02/24/2022     Lab Results   Component Value Date    WBC 7 28 02/24/2022    HGB 15 4 02/24/2022    HCT 47 1 (H) 02/24/2022    MCV 92 02/24/2022     02/24/2022     No results found for: INR      Imaging: I have personally reviewed pertinent reports  EKG:   Date: 2/7/23  Interpretation: normal sinus rhythm, low voltage QRS complexes        DEVICE INTERROGATION  N/A    Previous STRESS TEST:  Results for orders placed during the hospital encounter of 21    Stress test only, exercise    Narrative  [address] [facility] [address]  [place], [address]  [phone]    Stress Electrocardiography during Exercise    Name: Rosa Isela Mejia  MR #: [id number]  Account #: [de-identified]  Study date: 2021  : 1955  Age: 77 years  Gender: Female  Height: 66 in  Weight: 187 lb  BSA: 1 95 mï¾²    Allergies: BEE VENOM, CODEINE, OTHER, SHRIMP EXTRACT ALLERGY SKIN TEST - FOOD ALLERGY, STATINS, ZOSTER VAC RECOMB ADJUVANTED    Diagnosis: R07 9 - Chest pain, unspecified    Primary Physician:  Meryl Menard DO  Referring Physician:  Jens Alfaro DO  RN:  Jessica Srivastava RN BSN  GROUP:  [facility]  Interpreting Physician:  GETACHEW Tony   RN:  Jennifer Choe RN    CLINICAL QUESTION: Detection of coronary artery disease  HISTORY: The patient is a 77year old  female  Chest pain status: chest pain  Coronary artery disease risk factors: former smoking  Cardiovascular history: none significant  Medications: include aspirin  PHYSICAL EXAM: Baseline physical exam screening: S1 normal, S2 normal, and no wheezes audible  REST ECG: Normal sinus rhythm  PROCEDURE: Treadmill exercise testing was performed, using the Hesham protocol  Stress electrocardiographic evaluation was performed  HESHAM PROTOCOL:  HR bpm SBP mmHg DBP mmHg Symptoms  Baseline 90 128 72 none  Stage 1 109 158 70 none  Stage 2 115 162 66 none  Stage 3 127 174 70 mild dyspnea, fatigue  Stage 4 134 -- -- dyspnea, fatigue  Recovery 1 123 186 66 same as above  Recovery 2 105 -- -- subsiding  Recovery 3 100 160 82 none  Recovery 5 93 138 76 none  No medications or fluids given  STRESS SUMMARY: Baseline O2 sat 95%, Peak O2 sat 96% Duration of exercise was 9 min and 31 sec  The patient exercised to protocol stage 4  Maximal work rate was 10 9 METs   Maximal heart rate during stress was 134 bpm ( 88 % of maximal  predicted heart rate)  The heart rate response to stress was normal  There was normal resting blood pressure with an appropriate response to stress  The rate-pressure product for the peak heart rate and blood pressure was 28818  There was  no chest pain during stress  The stress test was terminated due to dyspnea and fatigue  The stress ECG was negative for ischemia  There were no stress arrhythmias or conduction abnormalities  SUMMARY:  -  Stress results: Duration of exercise was 9 min and 31 sec  Maximal heart rate during stress was 134 bpm ( 88 % of maximal predicted heart rate)  Target heart rate was achieved  There was normal resting blood pressure with an appropriate  response to stress  There was no chest pain during stress  -  ECG conclusions: The stress ECG was negative for ischemia  IMPRESSIONS: Normal study after maximal exercise  Prepared and signed by    GETACHEW Cardenas  Signed 05/04/2021 16:58:46     No results found for this or any previous visit  No results found for this or any previous visit  Previous Cath/PCI:  No results found for this or any previous visit  No results found for this or any previous visit  No results found for this or any previous visit  ECHO:  No results found for this or any previous visit  No results found for this or any previous visit  JUSTICE:  No results found for this or any previous visit  No results found for this or any previous visit  CMR:  No results found for this or any previous visit  No results found for this or any previous visit  No results found for this or any previous visit  HOLTER  No results found for this or any previous visit  No results found for this or any previous visit            Assessment/Plan     Assessment:    Increased risk for coronary artery disease  Prior history of chest pain with abnormal EKG  Currently pain/discomfort is only occasional, felt it first when lifting a weight  Has LDL of 218, very strong family history of CAD/sudden cardiac death as mentioned above    Hypertension  Blood pressure today is controlled at 130/90    Hyperlipidemia  Likely a familial component  LDL has been persistently >190, most recent 218  She has tried multiple statin medications at different dosages, however has been having myalgia with minimal change in LDL      Plan:  1  Given her increased risk of CAD, will order an exercise stress test    2  2D echo to evaluate for structure and function, and possible regional wall motion abnormalities given subtle Q waves in her EKG  3  Discussed about injectables for hypercholesterolemia, provided information on Repatha and Leqvio  She will discuss these options with her daughter who is a physician and let us know what she would prefer to have  4  Will call her with her stress test and echo results, and if negative, we can see her again in 3 months      Case discussed and reviewed with Dr Randy Baez who agrees with my assessment and plan  Thank you for involving us in the care of your patient  Oval MD Amy  Cardiology Fellow   PGY-4      ==========================================================================================    Epic/ Allscripts/Care Everywhere records reviewed: yes    ** Please Note: Fluency DirectDictation voice to text software may have been used in the creation of this document   **

## 2023-02-07 NOTE — PATIENT INSTRUCTIONS
Evolocumab (By injection)   Evolocumab ([id number]-[id number]-[id number])  Treats high levels of LDL cholesterol  Reduces the risk of heart attack, stroke, and heart surgery in patients with a heart and blood vessel disease  Brand Name(s): Repatha   There may be other brand names for this medicine  When This Medicine Should Not Be Used: This medicine is not right for everyone  Do not use it if you had an allergic reaction to evolocumab  How to Use This Medicine:   Injectable  Your doctor will prescribe your exact dose and tell you how often it should be given  This medicine is given as a shot under your skin  It is usually given in the stomach, thighs, or upper arms  A nurse or other health provider will give you this medicine  You may be taught how to give your medicine at home  Make sure you understand all instructions before giving yourself an injection  Do not use more medicine or use it more often than your doctor tells you to  This medicine comes in 3 forms: single-dose prefilled pen (autoinjector), single-dose prefilled syringe, and single-use Pushtronex™ system (on-body infusor with prefilled cartridge)  Your doctor will prescribe the type and dose that is right for you  You will be shown the body areas where this shot can be given  Use a different body area each time you give yourself a shot  Keep track of where you give each shot to make sure you rotate body areas  Do not inject into skin areas that have cuts, scrapes, scars, or stretch marks  Allow the autoinjector or syringe to warm to room temperature for at least 30 minutes before you use it, or for at least 45 minutes for the single-use Pushtronex™ system  Do not heat or shake the medicine  Use a new autoinjector or syringe (device) for each injection  Do not give more than one injection with one device or save leftover medicine in a used device    If you need more than one injection to get your full dose, give all the injections within 30 minutes  Carefully follow your doctor's instructions about any special diet  Read and follow the patient instructions that come with this medicine  Talk to your doctor or pharmacist if you have any questions  Missed dose: Take a dose as soon as you remember, as long as it is within 7 days of the missed dose  More than 7 days from your missed dose and you are using an every-2-week-dose: Inject the dose based on your regular schedule  More than 7 days from your missed dose and you are using a once-a-month-dose: Inject the dose and start a new schedule using this date  If you store this medicine at home, keep it in the refrigerator  Do not freeze  You may also keep the medicine in the original carton at room temperature for up to 30 days  Throw away any unused medicine after 30 days  Throw away used needles in a hard, closed container that the needles cannot poke through  Keep this container away from children and pets  Drugs and Foods to Avoid:      Ask your doctor or pharmacist before using any other medicine, including over-the-counter medicines, vitamins, and herbal products  Warnings While Using This Medicine:   Tell your doctor if you are pregnant or breastfeeding, or if you have a latex allergy  Your doctor will do lab tests at regular visits to check on the effects of this medicine  Keep all appointments  Keep all medicine out of the reach of children  Never share your medicine with anyone  Possible Side Effects While Using This Medicine:   Call your doctor right away if you notice any of these side effects:   Allergic reaction: Itching or hives, swelling in your face or hands, swelling or tingling in your mouth or throat, chest tightness, trouble breathing  If you notice these less serious side effects, talk with your doctor:   Back pain  Cough, stuffy or runny nose, sore throat  Pain, itching, burning, swelling, or a lump under your skin where the shot was given  If you notice other side effects that you think are caused by this medicine, tell your doctor  Call your doctor for medical advice about side effects  You may report side effects to FDA at 8-138-CMH-8036    © Copyright [facility] 2022 Information is for End User's use only and may not be sold, redistributed or otherwise used for commercial purposes  The above information is an  only  It is not intended as medical advice for individual conditions or treatments  Talk to your doctor, nurse or pharmacist before following any medical regimen to see if it is safe and effective for you  Inclisiran (By injection)   Inclisiran (in-kli-SIR-an)  Treats high levels of LDL cholesterol or heart disease  Brand Name(s): Leqvio   There may be other brand names for this medicine  When This Medicine Should Not Be Used: This medicine is not right for everyone  You should not receive it if you had an allergic reaction to inclisiran  How to Use This Medicine:   Injectable  Your doctor will prescribe your exact dose and tell you how often it should be given  This medicine is given as a shot under your skin  It is usually given in the thigh, upper arm, or stomach area  A nurse or other health provider will give you this medicine  Missed dose: Call your doctor or pharmacist for instructions  Drugs and Foods to Avoid:      Ask your doctor or pharmacist before using any other medicine, including over-the-counter medicines, vitamins, and herbal products  Warnings While Using This Medicine: It is not safe to take this medicine during pregnancy  It could harm an unborn baby  Tell your doctor right away if you become pregnant  Tell your doctor if you are breastfeeding, or if you have kidney disease or liver disease  Your doctor will do lab tests at regular visits to check on the effects of this medicine  Keep all appointments    Possible Side Effects While Using This Medicine:   Call your doctor right away if you notice any of these side effects: Allergic reaction: Itching or hives, swelling in your face or hands, swelling or tingling in your mouth or throat, chest tightness, trouble breathing  If you notice these less serious side effects, talk with your doctor:   Arm, leg, or joint pain  Change in how much or how often you urinate  Cough  Diarrhea  Pain, itching, burning, swelling, or a lump under your skin where the shot was given  If you notice other side effects that you think are caused by this medicine, tell your doctor  Call your doctor for medical advice about side effects  You may report side effects to FDA at 9-165-FDA-2101    © Copyright [employer] 2022 Information is for End User's use only and may not be sold, redistributed or otherwise used for commercial purposes  The above information is an  only  It is not intended as medical advice for individual conditions or treatments  Talk to your doctor, nurse or pharmacist before following any medical regimen to see if it is safe and effective for you      Familial hypercholesterolemia (FH)

## 2023-02-15 ENCOUNTER — RA CDI HCC (OUTPATIENT)
Dept: OTHER | Facility: HOSPITAL | Age: 68
End: 2023-02-15

## 2023-02-15 NOTE — PROGRESS NOTES
[address]  coding opportunities       Chart reviewed, no opportunity found: CHART REVIEWED, NO OPPORTUNITY FOUND        Patients Insurance     Medicare Insurance: Medicare

## 2023-02-22 ENCOUNTER — OFFICE VISIT (OUTPATIENT)
Dept: FAMILY MEDICINE CLINIC | Facility: CLINIC | Age: 68
End: 2023-02-22

## 2023-02-22 VITALS
HEIGHT: 67 IN | HEART RATE: 69 BPM | DIASTOLIC BLOOD PRESSURE: 80 MMHG | SYSTOLIC BLOOD PRESSURE: 120 MMHG | TEMPERATURE: 97.8 F | BODY MASS INDEX: 29.66 KG/M2 | OXYGEN SATURATION: 92 % | WEIGHT: 189 LBS

## 2023-02-22 DIAGNOSIS — K21.9 GERD WITHOUT ESOPHAGITIS: Primary | ICD-10-CM

## 2023-02-22 DIAGNOSIS — N18.30 STAGE 3 CHRONIC KIDNEY DISEASE, UNSPECIFIED WHETHER STAGE 3A OR 3B CKD (HCC): ICD-10-CM

## 2023-02-22 DIAGNOSIS — E66.3 OVERWEIGHT (BMI 25.0-29.9): ICD-10-CM

## 2023-02-22 DIAGNOSIS — J01.00 ACUTE NON-RECURRENT MAXILLARY SINUSITIS: ICD-10-CM

## 2023-02-22 DIAGNOSIS — E78.2 MIXED HYPERLIPIDEMIA: ICD-10-CM

## 2023-02-22 DIAGNOSIS — R73.01 IMPAIRED FASTING GLUCOSE: ICD-10-CM

## 2023-02-22 RX ORDER — AMOXICILLIN 500 MG/1
1000 CAPSULE ORAL EVERY 12 HOURS SCHEDULED
Qty: 28 CAPSULE | Refills: 0 | Status: SHIPPED | OUTPATIENT
Start: 2023-02-22 | End: 2023-03-01

## 2023-02-22 NOTE — PROGRESS NOTES
Assessment/Plan: Labs and records reviewed  Patient will follow-up with GI appropriately for GERD  Continue with Protonix  Patient will follow with cardiology appropriately and have stress test and echo done  Patient will have labs at follow-up visit to recheck impaired fasting glucose and chronic kidney disease stage III  Patient will consider weight loss medications  List given to the patient  Patient will use amoxicillin for sinusitis  Follow-up 6 months       Diagnoses and all orders for this visit:    GERD without esophagitis    Impaired fasting glucose    Stage 3 chronic kidney disease, unspecified whether stage 3a or 3b CKD (HCC)    Mixed hyperlipidemia    Acute non-recurrent maxillary sinusitis  -     amoxicillin (AMOXIL) 500 mg capsule; Take 2 capsules (1,000 mg total) by mouth every 12 (twelve) hours for 7 days    Overweight (BMI 25 0-29  9)            Subjective:        Patient ID: Beka Gaming is a 69 y o  female  Patient is here to follow-up on GERD, impaired fasting glucose chronic kidney disease and hyperlipidemia  Patient with URI symptoms  Nasal congestion, cough, rhinorrhea which is clear  No fevers noted  Patient using Claritin  Patient's symptoms improved with Protonix  Patient is seeing GI  Patient did see cardiology  Patient has echocardiogram as well as stress test set up for April  Patient wishes to lose weight  The following portions of the patient's history were reviewed and updated as appropriate: allergies, current medications, past family history, past medical history, past social history, past surgical history and problem list       Review of Systems   Constitutional: Negative  Negative for fever  HENT: Positive for congestion, postnasal drip, rhinorrhea, sinus pressure and sinus pain  Eyes: Negative  Respiratory: Positive for cough  Cardiovascular: Negative  Gastrointestinal: Negative  Endocrine: Negative  Genitourinary: Negative  Musculoskeletal: Positive for arthralgias  Skin: Negative  Allergic/Immunologic: Negative  Neurological: Negative  Hematological: Negative  Psychiatric/Behavioral: Negative  Objective:      BMI Counseling: Body mass index is 29 6 kg/m²  The BMI is above normal  Nutrition recommendations include consuming healthier snacks  Exercise recommendations include moderate physical activity 150 minutes/week  Rationale for BMI follow-up plan is due to patient being overweight or obese  Depression Screening and Follow-up Plan: Clincally patient does not have depression  No treatment is required  /80 (BP Location: Right arm, Patient Position: Sitting, Cuff Size: Standard)   Pulse 69   Temp 97 8 °F (36 6 °C) (Temporal)   Ht 5' 7" (1 702 m)   Wt 85 7 kg (189 lb)   SpO2 92%   BMI 29 60 kg/m²          Physical Exam  Vitals and nursing note reviewed  Constitutional:       General: She is not in acute distress  Appearance: Normal appearance  She is not ill-appearing, toxic-appearing or diaphoretic  HENT:      Head: Normocephalic and atraumatic  Right Ear: Tympanic membrane, ear canal and external ear normal  There is no impacted cerumen  Left Ear: Tympanic membrane, ear canal and external ear normal  There is no impacted cerumen  Nose: Nose normal  No congestion or rhinorrhea  Mouth/Throat:      Mouth: Mucous membranes are moist       Pharynx: Oropharyngeal exudate present  No posterior oropharyngeal erythema  Eyes:      General: No scleral icterus  Right eye: No discharge  Left eye: No discharge  Extraocular Movements: Extraocular movements intact  Conjunctiva/sclera: Conjunctivae normal       Pupils: Pupils are equal, round, and reactive to light  Neck:      Vascular: No carotid bruit  Cardiovascular:      Rate and Rhythm: Normal rate and regular rhythm  Pulses: Normal pulses  Heart sounds: Normal heart sounds  No murmur heard  No friction rub  No gallop  Pulmonary:      Effort: Pulmonary effort is normal  No respiratory distress  Breath sounds: Normal breath sounds  No stridor  No wheezing, rhonchi or rales  Chest:      Chest wall: No tenderness  Musculoskeletal:         General: No swelling, tenderness, deformity or signs of injury  Cervical back: Normal range of motion and neck supple  No rigidity  No muscular tenderness  Right lower leg: No edema  Left lower leg: No edema  Lymphadenopathy:      Cervical: No cervical adenopathy  Skin:     General: Skin is warm and dry  Capillary Refill: Capillary refill takes less than 2 seconds  Coloration: Skin is not jaundiced  Findings: No bruising, erythema, lesion or rash  Neurological:      Mental Status: She is alert and oriented to person, place, and time  Mental status is at baseline  Cranial Nerves: No cranial nerve deficit  Sensory: No sensory deficit  Motor: No weakness  Coordination: Coordination normal       Gait: Gait normal    Psychiatric:         Mood and Affect: Mood normal          Behavior: Behavior normal          Thought Content:  Thought content normal          Judgment: Judgment normal

## 2023-05-09 ENCOUNTER — OFFICE VISIT (OUTPATIENT)
Dept: CARDIOLOGY CLINIC | Facility: CLINIC | Age: 68
End: 2023-05-09

## 2023-05-09 VITALS
HEART RATE: 76 BPM | SYSTOLIC BLOOD PRESSURE: 96 MMHG | BODY MASS INDEX: 29.03 KG/M2 | DIASTOLIC BLOOD PRESSURE: 66 MMHG | WEIGHT: 185 LBS | HEIGHT: 67 IN | OXYGEN SATURATION: 97 %

## 2023-05-09 DIAGNOSIS — Z91.89 AT RISK FOR CORONARY ARTERY DISEASE: ICD-10-CM

## 2023-05-09 DIAGNOSIS — E78.01 FAMILIAL HYPERCHOLESTEROLEMIA: Primary | ICD-10-CM

## 2023-05-09 DIAGNOSIS — Z78.9 STATIN INTOLERANCE: ICD-10-CM

## 2023-05-09 NOTE — PROGRESS NOTES
General Cardiology - Outpatient Progress Note   Vicente Brian 69 y o  female   MRN: [id number]  @ Encounter: [id number]    True Marine, DO        Interval History:   Since last encounter, patient denies lightheadedness, dizziness, syncope, headache, vision changes, diaphoresis, chest pain, palpitations, shortness of breath, PND, orthopnea, nausea, vomiting, abdominal pain or lower extremity edema  She had a exercise stress test on 4/13/2023 which was normal  2D echo which was done on the same day showed normal LVEF 70%, hyperdynamic, no hemodynamically significant valvular lesions, PASP 33     Cardiac History Summary:   Vicente Brian is a 69y o  year old female who has a  history of uncontrolled hyperlipidemia, GERD, CKD 3, and a former smoker who was referred to [facility] outpatient Cardiology by her PCP for evaluation of prior history of chest pain with abnormal EKG        2/7/2023: Patient denies ongoing pain and recent chest pain  She had an episode in 2021 April where she was seen by her PCP, after an EKG, she was urgently sent to the ED for possible MI  In the ED however she was reassured and discharged home  She had a stress test in May 2021 which was negative  She is however concerned about this event as she has an extensive family history of CAD (3 brothers with CAD, mother, and maternal uncle with sudden cardiac death)  She also has difficult to control hyperlipidemia which appears to be familial in nature  She has occasional chest discomfort, retrosternal  She is active during summer with no reproduction of symptoms  Denies shortness of breath, palpitations, dizziness  No prior history of heart disease, she is a former smoker quit 30 years back, only occasional alcohol consumption  She was seen by GI recently for GERD for which she was started on PPI with resolution of symptoms  Objective:  Review of Systems   Constitutional: Negative for fatigue  Respiratory: Negative    Negative for chest tightness, shortness of breath and wheezing  Cardiovascular: Negative  Negative for chest pain, palpitations and leg swelling  Gastrointestinal: Negative  Genitourinary: Negative  Musculoskeletal: Negative  Neurological: Negative  Negative for dizziness and light-headedness  Psychiatric/Behavioral: Negative  Review of system was conducted and was negative except for as stated in the interval history      Physical Exam:  Vitals: There were no vitals taken for this visit  , There is no height or weight on file to calculate BMI     GEN: Paige Young appears well, alert and oriented x 3, pleasant and cooperative   HEENT:  Normocephalic, atraumatic, anicteric, moist mucous membranes  NECK:  noJVD or carotid bruits   HEART: regular rhythm, normal rate, normal S1 and S2, no murmurs, clicks, gallops or rubs   LUNGS: Clear to auscultation bilaterally; no wheezes, rales, or rhonchi; respiration nonlabored   ABDOMEN:  Normoactive bowel sounds, soft, no tenderness, no distention  EXTREMITIES: radial pulses  palpable; no edema  NEURO: no gross focal findings; cranial nerves grossly intact   SKIN:  Dry, intact, warm to touch    Home Medications: (Not in a hospital admission)      Current Outpatient Medications:   •  aspirin 81 mg chewable tablet, Chew 81 mg, Disp: , Rfl:   •  Multiple Vitamins-Minerals (MULTIVITAMIN GUMMIES ADULT PO), Take by mouth, Disp: , Rfl:   •  pantoprazole (PROTONIX) 40 mg tablet, Take 1 tablet (40 mg total) by mouth daily, Disp: 30 tablet, Rfl: 2    Labs & Results:  No results found for: HSTNI0, HSTNI2, HSTNI4, HSTNI  No results found for: NTBNP  Lab Results   Component Value Date    CHOL 256 (H) 10/07/2016    TRIG 67 02/24/2022    HDL 58 02/24/2022    LDLCALC 218 (H) 02/24/2022     Lab Results   Component Value Date     10/07/2016    K 4 3 02/24/2022    CO2 29 02/24/2022     (H) 02/24/2022    BUN 19 02/24/2022    CREATININE 1 05 02/24/2022    ALT 29 02/24/2022 AST 17 02/24/2022     Lab Results   Component Value Date    WBC 7 28 02/24/2022    HGB 15 4 02/24/2022    HCT 47 1 (H) 02/24/2022    MCV 92 02/24/2022     02/24/2022     No results found for: INR    EKG:  n/a    Device Interrogation:   n/a    HOLTER  No results found for this or any previous visit  No results found for this or any previous visit  Imaging: I have personally reviewed pertinent reports  Previous STRESS TEST:  Results for orders placed during the hospital encounter of 04/13/23    Stress test only, exercise    Interpretation Summary  •  Stress ECG: No ST deviation is noted  Arrhythmias during recovery: A single PVC  The ECG was negative for ischemia  The stress ECG is negative for ischemia  •  Stress ECG: The patient reached stage 3 0 of the protocol after exercising for 9 min and 0 sec and had a maximal HR of 133 bpm (88 % of MPHR) and 10 1 METS  Blood pressure demonstrated a normal response and heart rate demonstrated a normal response to stress  No results found for this or any previous visit  No results found for this or any previous visit  Previous Cath/PCI:  No results found for this or any previous visit  No results found for this or any previous visit  No results found for this or any previous visit  ECHO:  No results found for this or any previous visit  Results for orders placed during the hospital encounter of 04/13/23    Echo complete w/ contrast if indicated    Interpretation Summary  •  Left Ventricle: Left ventricular cavity size is normal  There is mild concentric hypertrophy  The left ventricular ejection fraction is 70% by visual estimation  Systolic function is hyperdynamic  Wall motion is normal  Diastolic function is mildly abnormal, consistent with grade I (abnormal) relaxation  •  Right Ventricle: Right ventricular cavity size is normal  Systolic function is normal   •  Aortic Valve: There is mild regurgitation   There is aortic valve sclerosis  •  Mitral Valve: There is mild annular calcification  There is trace regurgitation  •  Tricuspid Valve: There is trace regurgitation  Pulmonary artery systolic pressures are estimated at 33 mmHg  •  There is no study for comparison  JUSTICE:  No results found for this or any previous visit  No results found for this or any previous visit  CMR:  No results found for this or any previous visit  No results found for this or any previous visit  No results found for this or any previous visit  Assessment:    Increased risk for coronary artery disease  Prior history of chest pain with abnormal EKG  No pain currently  Has LDL of 218, very strong family history of CAD/sudden cardiac death as mentioned above  Exercise stress test was negative for ischemia     Hypertension  Blood pressure today is controlled at 96/66     Hyperlipidemia  Likely a familial component  LDL has been persistently >190, most recent 218  She tried atorvastatin, rosuvastatin, and lovastatin however has not tolerated due to significant myalgia with minimal change in LDL        Plan:   1  Discussed about injectables for hypercholesterolemia, provided information on Repatha and Leqvio  We will start pre auth for Leqvio  2  Follow up in 6 months  3  Repeat echo in 3-5 years for trace-mild valvular regurgitation noted in echo  Case discussed and reviewed with Dr Tash Ortiz who agrees with my assessment and plan  Rigo Shen MD  Cardiology Fellow   PGY-4    ==========================================================================================    Epic/ Allscripts/Care Everywhere records reviewed: yes    ** Please Note: Fluency DirectDictation voice to text software may have been used in the creation of this document   **

## 2023-06-06 ENCOUNTER — TELEPHONE (OUTPATIENT)
Dept: CARDIOLOGY CLINIC | Facility: CLINIC | Age: 68
End: 2023-06-06

## 2023-06-06 ENCOUNTER — DOCUMENTATION (OUTPATIENT)
Dept: CARDIOLOGY CLINIC | Facility: CLINIC | Age: 68
End: 2023-06-06

## 2023-06-06 NOTE — TELEPHONE ENCOUNTER
PC to patient that Aliyah Espinal was approved and patient wants to wait until August to start  First appointment made at

## 2023-08-16 ENCOUNTER — TELEPHONE (OUTPATIENT)
Dept: CARDIOLOGY CLINIC | Facility: CLINIC | Age: 68
End: 2023-08-16

## 2023-08-16 NOTE — TELEPHONE ENCOUNTER
1st levqvio injection was scheduled for Friday Aug 18. Injection time needed to be moved to earlier in the day. Patient was contacted by schedulers, Patient cancelled injection for Friday. Left message for patient to call office to reschedule injection.

## 2023-09-01 ENCOUNTER — TELEPHONE (OUTPATIENT)
Dept: CARDIOLOGY CLINIC | Facility: CLINIC | Age: 68
End: 2023-09-01

## 2023-09-01 NOTE — TELEPHONE ENCOUNTER
Placed call to patient regarding rescheduling LEQVIO injection. Patient states she is on vacation for the whole month of September and will be returning mid October. She doesn't have her schedule to look at to reschedule the LEQVIO injection at this time. She would like for us to call her in October to schedule.

## 2023-10-23 ENCOUNTER — OFFICE VISIT (OUTPATIENT)
Dept: FAMILY MEDICINE CLINIC | Facility: CLINIC | Age: 68
End: 2023-10-23
Payer: MEDICARE

## 2023-10-23 VITALS
SYSTOLIC BLOOD PRESSURE: 118 MMHG | DIASTOLIC BLOOD PRESSURE: 84 MMHG | HEIGHT: 67 IN | TEMPERATURE: 97.5 F | BODY MASS INDEX: 29.13 KG/M2 | OXYGEN SATURATION: 96 % | WEIGHT: 185.6 LBS | HEART RATE: 73 BPM

## 2023-10-23 DIAGNOSIS — L03.114 CELLULITIS OF LEFT UPPER EXTREMITY: Primary | ICD-10-CM

## 2023-10-23 DIAGNOSIS — R68.83 CHILLS: ICD-10-CM

## 2023-10-23 PROCEDURE — 99214 OFFICE O/P EST MOD 30 MIN: CPT | Performed by: FAMILY MEDICINE

## 2023-10-23 RX ORDER — OMEPRAZOLE 20 MG/1
CAPSULE, DELAYED RELEASE ORAL
COMMUNITY

## 2023-10-23 RX ORDER — DOXYCYCLINE HYCLATE 100 MG/1
100 CAPSULE ORAL EVERY 12 HOURS SCHEDULED
Qty: 28 CAPSULE | Refills: 0 | Status: SHIPPED | OUTPATIENT
Start: 2023-10-23 | End: 2023-11-06

## 2023-10-23 NOTE — PROGRESS NOTES
Assessment/Plan: Patient is doxycycline twice daily with food. Patient will go for Lyme titer in the next few weeks. Other guidance given at this time. Diagnoses and all orders for this visit:    Cellulitis of left upper extremity  -     Lyme Total AB W Reflex to IGM/IGG; Future  -     doxycycline hyclate (VIBRAMYCIN) 100 mg capsule; Take 1 capsule (100 mg total) by mouth every 12 (twelve) hours for 14 days    Chills  -     Lyme Total AB W Reflex to IGM/IGG; Future  -     doxycycline hyclate (VIBRAMYCIN) 100 mg capsule; Take 1 capsule (100 mg total) by mouth every 12 (twelve) hours for 14 days    Other orders  -     omeprazole (PriLOSEC) 20 mg delayed release capsule            Subjective:        Patient ID: Awilda Opitz is a 77 y.o. female. Patient is here with possible infected bite on left forearm. Patient noticed pimple-like formation of lesion on left forearm roughly 1 week ago. Patient with some chills yesterday. Patient did have some achiness/body aches on Friday. Patient did use Aleve. No treatment to the form. No tick noted on patient. No chest pain shortness of breath or headache. Insect Bite          The following portions of the patient's history were reviewed and updated as appropriate: allergies, current medications, past family history, past medical history, past social history, past surgical history and problem list.      Review of Systems   Constitutional: Negative. HENT: Negative. Eyes: Negative. Respiratory: Negative. Cardiovascular: Negative. Gastrointestinal: Negative. Endocrine: Negative. Genitourinary: Negative. Musculoskeletal: Negative. Skin:  Positive for color change. Allergic/Immunologic: Negative. Neurological: Negative. Hematological: Negative. Psychiatric/Behavioral: Negative. Objective:      BMI Counseling: Body mass index is 29.07 kg/m².  The BMI is above normal. Nutrition recommendations include encouraging healthy choices of fruits and vegetables. Exercise recommendations include moderate physical activity 150 minutes/week. Rationale for BMI follow-up plan is due to patient being overweight or obese. Depression Screening and Follow-up Plan: Patient was screened for depression during today's encounter. They screened negative with a PHQ-2 score of 0.            /84 (BP Location: Right arm, Patient Position: Sitting, Cuff Size: Large)   Pulse 73   Temp 97.5 °F (36.4 °C) (Tympanic)   Ht 5' 7" (1.702 m)   Wt 84.2 kg (185 lb 9.6 oz)   SpO2 96%   BMI 29.07 kg/m²          Physical Exam  Vitals and nursing note reviewed. Constitutional:       General: She is not in acute distress. Appearance: Normal appearance. She is not ill-appearing, toxic-appearing or diaphoretic. HENT:      Head: Normocephalic and atraumatic. Right Ear: Tympanic membrane, ear canal and external ear normal. There is no impacted cerumen. Left Ear: Tympanic membrane, ear canal and external ear normal. There is no impacted cerumen. Nose: Nose normal. No congestion or rhinorrhea. Mouth/Throat:      Mouth: Mucous membranes are moist.      Pharynx: No oropharyngeal exudate or posterior oropharyngeal erythema. Eyes:      General: No scleral icterus. Right eye: No discharge. Left eye: No discharge. Neck:      Vascular: No carotid bruit. Cardiovascular:      Rate and Rhythm: Normal rate and regular rhythm. Pulses: Normal pulses. Heart sounds: Normal heart sounds. No murmur heard. No friction rub. No gallop. Pulmonary:      Effort: Pulmonary effort is normal. No respiratory distress. Breath sounds: Normal breath sounds. No stridor. No wheezing, rhonchi or rales. Chest:      Chest wall: No tenderness. Musculoskeletal:         General: No swelling, tenderness, deformity or signs of injury. Normal range of motion. Cervical back: Normal range of motion and neck supple.  No rigidity. No muscular tenderness. Right lower leg: No edema. Left lower leg: No edema. Lymphadenopathy:      Cervical: No cervical adenopathy. Skin:     General: Skin is warm and dry. Capillary Refill: Capillary refill takes less than 2 seconds. Coloration: Skin is not jaundiced. Findings: Erythema and lesion present. No bruising or rash. Comments: Erythema, bite left forearm   Neurological:      Mental Status: She is alert and oriented to person, place, and time. Mental status is at baseline. Cranial Nerves: No cranial nerve deficit. Sensory: No sensory deficit. Motor: No weakness. Coordination: Coordination normal.      Gait: Gait normal.   Psychiatric:         Mood and Affect: Mood normal.         Behavior: Behavior normal.         Thought Content:  Thought content normal.         Judgment: Judgment normal.

## 2023-10-30 ENCOUNTER — LAB (OUTPATIENT)
Dept: LAB | Facility: MEDICAL CENTER | Age: 68
End: 2023-10-30
Payer: MEDICARE

## 2023-10-30 ENCOUNTER — TELEPHONE (OUTPATIENT)
Dept: FAMILY MEDICINE CLINIC | Facility: CLINIC | Age: 68
End: 2023-10-30

## 2023-10-30 DIAGNOSIS — L03.114 CELLULITIS OF LEFT UPPER EXTREMITY: ICD-10-CM

## 2023-10-30 DIAGNOSIS — R68.83 CHILLS: ICD-10-CM

## 2023-10-30 LAB — B BURGDOR IGG+IGM SER QL IA: NEGATIVE

## 2023-10-30 PROCEDURE — 36415 COLL VENOUS BLD VENIPUNCTURE: CPT

## 2023-10-30 PROCEDURE — 86618 LYME DISEASE ANTIBODY: CPT

## 2023-10-30 NOTE — TELEPHONE ENCOUNTER
Patient called into the office stating she took 6 days worth of the doxycycline however she was experiencing sever headache, sensitivity to light, nausea, vomiting and diarrhea. She also was having muscle pain but it was hard to determine if this was associated with the potential lyme or the antibiotic. So she stopped it at this point. She just wanted you to be aware she will be getting the BW done that was ordered to check for the lyme.

## 2023-11-03 ENCOUNTER — TELEPHONE (OUTPATIENT)
Dept: CARDIOLOGY CLINIC | Facility: CLINIC | Age: 68
End: 2023-11-03

## 2023-11-16 ENCOUNTER — OFFICE VISIT (OUTPATIENT)
Dept: FAMILY MEDICINE CLINIC | Facility: CLINIC | Age: 68
End: 2023-11-16
Payer: MEDICARE

## 2023-11-16 VITALS
TEMPERATURE: 99.2 F | DIASTOLIC BLOOD PRESSURE: 70 MMHG | BODY MASS INDEX: 29.51 KG/M2 | WEIGHT: 188 LBS | OXYGEN SATURATION: 97 % | SYSTOLIC BLOOD PRESSURE: 120 MMHG | HEIGHT: 67 IN | HEART RATE: 87 BPM

## 2023-11-16 DIAGNOSIS — K04.7 DENTAL ABSCESS: Primary | ICD-10-CM

## 2023-11-16 PROCEDURE — 99213 OFFICE O/P EST LOW 20 MIN: CPT | Performed by: FAMILY MEDICINE

## 2023-11-16 RX ORDER — AMOXICILLIN 500 MG/1
1000 CAPSULE ORAL EVERY 12 HOURS SCHEDULED
Qty: 28 CAPSULE | Refills: 0 | Status: SHIPPED | OUTPATIENT
Start: 2023-11-16 | End: 2023-11-23

## 2023-11-16 NOTE — PROGRESS NOTES
Assessment/Plan: Patient is amoxicillin as directed. Diagnoses and all orders for this visit:    Dental abscess  -     amoxicillin (AMOXIL) 500 mg capsule; Take 2 capsules (1,000 mg total) by mouth every 12 (twelve) hours for 7 days            Subjective:        Patient ID: Cheryl Fletcher is a 77 y.o. female. Patient well left-sided is sore throat and adenopathy on the left. No significant fever. No significant body aches or fatigue associated with this. Appetite is good. No vomiting or diarrhea associated with this. COVID test negative. No significant worsening of other URI symptoms. Patient did take Tylenol. The following portions of the patient's history were reviewed and updated as appropriate: allergies, current medications, past family history, past medical history, past social history, past surgical history and problem list.      Review of Systems   Constitutional: Negative. HENT:  Positive for sore throat. Eyes: Negative. Respiratory: Negative. Cardiovascular: Negative. Gastrointestinal: Negative. Endocrine: Negative. Genitourinary: Negative. Musculoskeletal: Negative. Skin: Negative. Allergic/Immunologic: Negative. Neurological: Negative. Hematological: Negative. Psychiatric/Behavioral: Negative. Objective:               /70   Pulse 87   Temp 99.2 °F (37.3 °C)   Ht 5' 7" (1.702 m)   Wt 85.3 kg (188 lb)   SpO2 97%   BMI 29.44 kg/m²          Physical Exam  Vitals and nursing note reviewed. Constitutional:       General: She is not in acute distress. Appearance: Normal appearance. She is not ill-appearing, toxic-appearing or diaphoretic. HENT:      Head: Normocephalic and atraumatic. Right Ear: Tympanic membrane, ear canal and external ear normal. There is no impacted cerumen. Left Ear: Tympanic membrane, ear canal and external ear normal. There is no impacted cerumen.       Nose: Nose normal. No congestion or rhinorrhea. Mouth/Throat:      Mouth: Mucous membranes are moist.      Pharynx: No oropharyngeal exudate or posterior oropharyngeal erythema. Eyes:      General: No scleral icterus. Right eye: No discharge. Left eye: No discharge. Neck:      Vascular: No carotid bruit. Cardiovascular:      Rate and Rhythm: Normal rate and regular rhythm. Pulses: Normal pulses. Heart sounds: Normal heart sounds. No murmur heard. No friction rub. No gallop. Pulmonary:      Effort: Pulmonary effort is normal. No respiratory distress. Breath sounds: Normal breath sounds. No stridor. No wheezing, rhonchi or rales. Chest:      Chest wall: No tenderness. Musculoskeletal:         General: No swelling, tenderness, deformity or signs of injury. Normal range of motion. Cervical back: Normal range of motion and neck supple. No rigidity. No muscular tenderness. Right lower leg: No edema. Left lower leg: No edema. Lymphadenopathy:      Cervical: Cervical adenopathy present. Skin:     General: Skin is warm and dry. Capillary Refill: Capillary refill takes less than 2 seconds. Coloration: Skin is not jaundiced. Findings: No bruising, erythema, lesion or rash. Neurological:      Mental Status: She is alert and oriented to person, place, and time. Mental status is at baseline. Cranial Nerves: No cranial nerve deficit. Sensory: No sensory deficit. Motor: No weakness. Coordination: Coordination normal.      Gait: Gait normal.   Psychiatric:         Mood and Affect: Mood normal.         Behavior: Behavior normal.         Thought Content:  Thought content normal.         Judgment: Judgment normal.

## 2024-01-25 ENCOUNTER — OFFICE VISIT (OUTPATIENT)
Dept: FAMILY MEDICINE CLINIC | Facility: CLINIC | Age: 69
End: 2024-01-25
Payer: MEDICARE

## 2024-01-25 ENCOUNTER — TELEPHONE (OUTPATIENT)
Age: 69
End: 2024-01-25

## 2024-01-25 VITALS
HEART RATE: 73 BPM | WEIGHT: 188 LBS | DIASTOLIC BLOOD PRESSURE: 66 MMHG | BODY MASS INDEX: 29.51 KG/M2 | SYSTOLIC BLOOD PRESSURE: 100 MMHG | OXYGEN SATURATION: 98 % | TEMPERATURE: 98.4 F | HEIGHT: 67 IN

## 2024-01-25 DIAGNOSIS — N18.30 STAGE 3 CHRONIC KIDNEY DISEASE, UNSPECIFIED WHETHER STAGE 3A OR 3B CKD (HCC): ICD-10-CM

## 2024-01-25 DIAGNOSIS — L03.115 CELLULITIS OF RIGHT LOWER EXTREMITY: Primary | ICD-10-CM

## 2024-01-25 DIAGNOSIS — E78.2 MIXED HYPERLIPIDEMIA: Primary | ICD-10-CM

## 2024-01-25 PROCEDURE — 99213 OFFICE O/P EST LOW 20 MIN: CPT | Performed by: FAMILY MEDICINE

## 2024-01-25 RX ORDER — DOXYCYCLINE HYCLATE 100 MG/1
100 CAPSULE ORAL EVERY 12 HOURS SCHEDULED
Qty: 20 CAPSULE | Refills: 0 | Status: SHIPPED | OUTPATIENT
Start: 2024-01-25 | End: 2024-02-04

## 2024-01-25 NOTE — TELEPHONE ENCOUNTER
Patient called regarding CAC test. Patient states that this lab test is called Coronary artery calcium test. Patient saw Dr. Larose on 1/25/24. Please have Dr. Larose review.

## 2024-01-25 NOTE — PROGRESS NOTES
"Assessment/Plan: Patient's tetanus shot up-to-date done roughly 3 to 4 years ago.  Local care discussed with the patient.  Patient will start doxycycline as directed.       Diagnoses and all orders for this visit:    Cellulitis of right lower extremity  -     doxycycline hyclate (VIBRAMYCIN) 100 mg capsule; Take 1 capsule (100 mg total) by mouth every 12 (twelve) hours for 10 days    Stage 3 chronic kidney disease, unspecified whether stage 3a or 3b CKD (HCC)            Subjective:        Patient ID: Rosemary Barroso is a 68 y.o. female.      Patient is here with cut to the right first toe as well as the second and third toe status post pedicure.  With some pain and redness and swelling right first toe.  No fevers noted.          The following portions of the patient's history were reviewed and updated as appropriate: allergies, current medications, past family history, past medical history, past social history, past surgical history and problem list.      Review of Systems   Constitutional: Negative.    HENT: Negative.     Eyes: Negative.    Respiratory: Negative.     Cardiovascular: Negative.    Gastrointestinal: Negative.    Endocrine: Negative.    Genitourinary: Negative.    Musculoskeletal: Negative.    Skin:  Positive for wound.   Allergic/Immunologic: Negative.    Neurological: Negative.    Hematological: Negative.    Psychiatric/Behavioral: Negative.             Objective:               /66 (BP Location: Right arm, Patient Position: Sitting, Cuff Size: Standard)   Pulse 73   Temp 98.4 °F (36.9 °C) (Temporal)   Ht 5' 7\" (1.702 m)   Wt 85.3 kg (188 lb)   SpO2 98%   BMI 29.44 kg/m²          Physical Exam  Vitals and nursing note reviewed.   Skin:     Findings: Erythema present.      Comments: Mild erythema and redness over the distal first toe.  Patient with superficial cut to the distal first toe.         "

## 2024-02-08 ENCOUNTER — HOSPITAL ENCOUNTER (OUTPATIENT)
Dept: CT IMAGING | Facility: HOSPITAL | Age: 69
Discharge: HOME | End: 2024-02-08
Payer: COMMERCIAL

## 2024-02-08 DIAGNOSIS — E78.2 MIXED HYPERLIPIDEMIA: ICD-10-CM

## 2024-02-08 PROCEDURE — 75571 CT HRT W/O DYE W/CA TEST: CPT

## 2024-02-08 PROCEDURE — G1004 CDSM NDSC: HCPCS

## 2024-02-21 PROBLEM — Z00.00 WELL ADULT EXAM: Status: RESOLVED | Noted: 2019-03-27 | Resolved: 2024-02-21

## 2024-02-21 PROBLEM — H10.32 ACUTE BACTERIAL CONJUNCTIVITIS OF LEFT EYE: Status: RESOLVED | Noted: 2020-05-05 | Resolved: 2024-02-21

## 2024-02-21 PROBLEM — J01.00 ACUTE NON-RECURRENT MAXILLARY SINUSITIS: Status: RESOLVED | Noted: 2020-05-05 | Resolved: 2024-02-21

## 2024-02-27 ENCOUNTER — RA CDI HCC (OUTPATIENT)
Dept: OTHER | Facility: HOSPITAL | Age: 69
End: 2024-02-27

## 2024-02-27 ENCOUNTER — TELEPHONE (OUTPATIENT)
Dept: CARDIOLOGY CLINIC | Facility: CLINIC | Age: 69
End: 2024-02-27

## 2024-02-27 NOTE — TELEPHONE ENCOUNTER
Placed call to patient. She had calcium score done 2/8/2024. Her results were normal and therefore she does not want to be on any cholesterol lowering medications and has been working on her diet. She stated she had a lipid panel done recently and her levels came down. We do not have these results in EPIC.

## 2024-02-27 NOTE — TELEPHONE ENCOUNTER
----- Message from Patricia Fahringer sent at 2/27/2024 12:05 PM EST -----  Can you please advise the patient of this Thank you   ----- Message -----  From: Akash Walker MD  Sent: 2/27/2024  10:44 AM EST  To: Patricia Fahringer    Rosemary Gamboa has an LDL level >190, and we feel she has a familial component to her hypercholesterolemia. Therefore, coronary calcium scoring is not indicated. It's not going to change the decision on treating her high lipids.     ----- Message -----  From: Patricia Fahringer  Sent: 1/12/2024   3:52 PM EST  To: Akash Walker MD    Called pt for recall appt  she stated that her PCP thought she should have a calcium scored test before she starts the shots I asked why her PCP did not put order in ,she didn't know.Please advise

## 2024-02-28 ENCOUNTER — TELEPHONE (OUTPATIENT)
Age: 69
End: 2024-02-28

## 2024-02-28 NOTE — TELEPHONE ENCOUNTER
Patient called to verify telephone number for Dr. Pablo Churchill. I provided the patient with the number. No further questions or concerns.

## 2024-03-05 ENCOUNTER — OFFICE VISIT (OUTPATIENT)
Dept: FAMILY MEDICINE CLINIC | Facility: CLINIC | Age: 69
End: 2024-03-05
Payer: MEDICARE

## 2024-03-05 VITALS
SYSTOLIC BLOOD PRESSURE: 110 MMHG | DIASTOLIC BLOOD PRESSURE: 78 MMHG | HEART RATE: 67 BPM | BODY MASS INDEX: 29.03 KG/M2 | WEIGHT: 185 LBS | HEIGHT: 67 IN | OXYGEN SATURATION: 95 % | TEMPERATURE: 98.7 F

## 2024-03-05 DIAGNOSIS — R41.3 MEMORY LOSS: ICD-10-CM

## 2024-03-05 DIAGNOSIS — K21.9 GERD WITHOUT ESOPHAGITIS: ICD-10-CM

## 2024-03-05 DIAGNOSIS — E78.2 MIXED HYPERLIPIDEMIA: Primary | ICD-10-CM

## 2024-03-05 DIAGNOSIS — K44.9 HIATAL HERNIA: ICD-10-CM

## 2024-03-05 DIAGNOSIS — R73.01 IMPAIRED FASTING GLUCOSE: ICD-10-CM

## 2024-03-05 PROCEDURE — 99214 OFFICE O/P EST MOD 30 MIN: CPT | Performed by: FAMILY MEDICINE

## 2024-03-05 PROCEDURE — G2211 COMPLEX E/M VISIT ADD ON: HCPCS | Performed by: FAMILY MEDICINE

## 2024-03-05 NOTE — PROGRESS NOTES
Assessment/Plan: As given overall.  CAT scan of the chest discussed with the patient.  Coronary calcium score 6.0.  Patient may follow cardiology appropriately.  Patient will have laboratory studies to reevaluate hyperlipidemic state as well as memory loss.  Patient will have UA C&S done also.  Patient will consider MRI of the brain.  Patient being referred to geriatrician for further dementia workup.  Patient will have Pepcid or Protonix as needed for hiatal hernia/GERD symptoms.  All questions answered.  Patient does not wish to see GI at present time.  Follow-up in 6 months or as needed       Diagnoses and all orders for this visit:    Mixed hyperlipidemia  -     Lipid panel; Future    GERD without esophagitis    Hiatal hernia    Memory loss  -     Comprehensive metabolic panel; Future  -     CBC and differential; Future  -     Hemoglobin A1C; Future  -     Lipid panel; Future  -     TSH, 3rd generation with Free T4 reflex; Future  -     Vitamin B12; Future  -     Urinalysis with microscopic; Future  -     Ambulatory Referral to [facility]; Future    Impaired fasting glucose  -     Hemoglobin A1C; Future            Subjective:        Patient ID: Rosemary Barroso is a 68 y.o. female.      Patient is here to follow-up on CAT scan of the coronary arteries, hyperlipidemia, GERD, memory loss and impaired fasting glucose.  Patient with increased concentration issues recently over the past 2 months.  Patient has noticed that she has been under more stress than normal.  GERD is relatively stable overall.  Patient does use medications as needed.  Patient did have CT of the chest which was discussed with her.  Patient does have hiatal hernia.  No significant change in urination defecation.          The following portions of the patient's history were reviewed and updated as appropriate: allergies, current medications, past family history, past medical history, past social history, past surgical history and problem  "list.      Review of Systems   Constitutional: Negative.    HENT: Negative.     Eyes: Negative.    Respiratory: Negative.     Cardiovascular: Negative.    Gastrointestinal:         Intermittent GERD symptoms   Endocrine: Negative.    Genitourinary: Negative.    Musculoskeletal: Negative.    Skin: Negative.    Allergic/Immunologic: Negative.    Neurological: Negative.    Hematological: Negative.    Psychiatric/Behavioral:  Positive for decreased concentration.            Objective:        Depression Screening and Follow-up Plan: Patient was screened for depression during today's encounter. They screened negative with a PHQ-2 score of 0.            /78 (BP Location: Right arm, Patient Position: Sitting, Cuff Size: Standard)   Pulse 67   Temp 98.7 °F (37.1 °C) (Temporal)   Ht 5' 7\" (1.702 m)   Wt 83.9 kg (185 lb)   SpO2 95%   BMI 28.98 kg/m²          Physical Exam  Vitals and nursing note reviewed.   Constitutional:       General: She is not in acute distress.     Appearance: Normal appearance. She is not ill-appearing, toxic-appearing or diaphoretic.   HENT:      Head: Normocephalic and atraumatic.      Right Ear: Tympanic membrane, ear canal and external ear normal. There is no impacted cerumen.      Left Ear: Tympanic membrane, ear canal and external ear normal. There is no impacted cerumen.      Nose: Nose normal. No congestion or rhinorrhea.      Mouth/Throat:      Mouth: Mucous membranes are moist.      Pharynx: No oropharyngeal exudate or posterior oropharyngeal erythema.   Eyes:      General: No scleral icterus.        Right eye: No discharge.         Left eye: No discharge.   Neck:      Vascular: No carotid bruit.   Cardiovascular:      Rate and Rhythm: Normal rate and regular rhythm.      Pulses: Normal pulses.      Heart sounds: Normal heart sounds. No murmur heard.     No friction rub. No gallop.   Pulmonary:      Effort: Pulmonary effort is normal. No respiratory distress.      Breath sounds: " Normal breath sounds. No stridor. No wheezing, rhonchi or rales.   Chest:      Chest wall: No tenderness.   Musculoskeletal:         General: No swelling, tenderness, deformity or signs of injury. Normal range of motion.      Cervical back: Normal range of motion and neck supple. No rigidity. No muscular tenderness.      Right lower leg: No edema.      Left lower leg: No edema.   Lymphadenopathy:      Cervical: No cervical adenopathy.   Skin:     General: Skin is warm and dry.      Capillary Refill: Capillary refill takes less than 2 seconds.      Coloration: Skin is not jaundiced.      Findings: No bruising, erythema, lesion or rash.   Neurological:      Mental Status: She is alert and oriented to person, place, and time.      Cranial Nerves: No cranial nerve deficit.      Sensory: No sensory deficit.      Motor: No weakness.      Coordination: Coordination normal.      Gait: Gait normal.   Psychiatric:         Mood and Affect: Mood normal.         Behavior: Behavior normal.         Thought Content: Thought content normal.         Judgment: Judgment normal.

## 2024-10-07 ENCOUNTER — RA CDI HCC (OUTPATIENT)
Dept: OTHER | Facility: HOSPITAL | Age: 69
End: 2024-10-07

## 2024-10-07 NOTE — PROGRESS NOTES
HCC coding opportunities       Chart reviewed, no opportunity found: CHART REVIEWED, NO OPPORTUNITY FOUND        Patients Insurance     Medicare Insurance: Medicare           [facility] Progress Note    Date: April 9, 2021      1500: Pt arrived ambulatory to [facility] for Pump Disconnection + Port Flush + OB Neulasta in stable condition. Assessment completed. No other complaints voiced at this time. Pump completed on arrival. Per patient, pump completed at 1:00 pm. Pump removed and port flushed with positive blood return. Patient denies any symptoms of COVID-19, including SOB, coughing, fever, or generally not feeling well. Patient denies any recent exposure to COVID-19. Patient denies any recent contact with family or friends that have a pending COVID-19 test.    Patient Vitals for the past 12 hrs:   Temp Pulse Resp BP   04/09/21 1505 97.7 °F (36.5 °C) 81 18 112/61       Medications Administered     0.9% sodium chloride injection 10 mL     Admin Date  04/09/2021 Action  Given Dose  10 mL Route  IntraVENous Administered By  [place]          heparin (porcine) pf 300-500 Units     Admin Date  04/09/2021 Action  Given Dose  500 Units Route  InterCATHeter Administered By  [place]fredo          pegfilgrastim (NEULASTA) wearable SQ injector 6 mg     Admin Date  04/09/2021 Action  Given Dose  6 mg Route  SubCUTAneous Administered By  [employer]              OB Neulasta placed to right upper arm. Education provided to patient about Neulasta On Body Injector including: side effects, how/when to remove the device, as well as what to do in the event of device malfunction. Opportunity for questions was provided and all questions were answered. Patient verbalized understanding. 1515: Tolerated treatment well, no adverse reactions noted. Port flushed, heparinized and de- accessed per protocol. D/Cd from [facility] ambulatory and in no distress. Patient is aware of next scheduled [facility] appointment on 4/21/21.     Future Appointments   Date Time Provider Yary Gomez   4/9/2021  4:30 PM H1 WALKER DOSS [facility]    4/12/2021  9:30 AM [facility] OP 2 WILMERUS . [facility]  4/21/2021  9:00 AM D3 WALKER LONG [address] [facility] H   4/21/2021  9:30 AM Shawanda Kay, VINCENT [address] BS AMB   4/23/2021  4:30 PM H1 WALKER MEJIA [facility] H   5/5/2021  9:00 AM D3 WALKER LONG [facility] [facility]   5/7/2021  4:00 PM H1 WALKER MEJIA [address]           Reynaldo Arriaga RN  April 9, 2021

## 2024-10-14 ENCOUNTER — OFFICE VISIT (OUTPATIENT)
Age: 69
End: 2024-10-14
Payer: MEDICARE

## 2024-10-14 VITALS
HEIGHT: 67 IN | HEART RATE: 80 BPM | BODY MASS INDEX: 29.54 KG/M2 | SYSTOLIC BLOOD PRESSURE: 128 MMHG | OXYGEN SATURATION: 96 % | DIASTOLIC BLOOD PRESSURE: 76 MMHG | WEIGHT: 188.2 LBS | TEMPERATURE: 98.7 F

## 2024-10-14 DIAGNOSIS — Z13.6 SCREENING FOR CARDIOVASCULAR CONDITION: ICD-10-CM

## 2024-10-14 DIAGNOSIS — Z11.59 NEED FOR HEPATITIS C SCREENING TEST: ICD-10-CM

## 2024-10-14 DIAGNOSIS — Z13.1 SCREENING FOR DIABETES MELLITUS: ICD-10-CM

## 2024-10-14 DIAGNOSIS — Z00.00 MEDICARE ANNUAL WELLNESS VISIT, SUBSEQUENT: Primary | ICD-10-CM

## 2024-10-14 DIAGNOSIS — Z12.11 COLON CANCER SCREENING: ICD-10-CM

## 2024-10-14 DIAGNOSIS — Z78.0 ASYMPTOMATIC POSTMENOPAUSAL STATE: ICD-10-CM

## 2024-10-14 DIAGNOSIS — Z12.31 ENCOUNTER FOR SCREENING MAMMOGRAM FOR BREAST CANCER: ICD-10-CM

## 2024-10-14 DIAGNOSIS — J06.9 ACUTE URI: ICD-10-CM

## 2024-10-14 PROCEDURE — G0439 PPPS, SUBSEQ VISIT: HCPCS | Performed by: FAMILY MEDICINE

## 2024-10-14 NOTE — PROGRESS NOTES
Ambulatory Visit  Name: Rosemary Barroso      : 1955      MRN: [id number]  Encounter Provider: Shoaib Larose DO  Encounter Date: 10/14/2024   Encounter department: [facility] PRIMARY CARE    Assessment & Plan  Medicare annual wellness visit, subsequent         Screening for diabetes mellitus    Orders:    Glucose, fasting; Future    Screening for cardiovascular condition    Orders:    Lipid panel; Future    Need for hepatitis C screening test         Encounter for screening mammogram for breast cancer    Orders:    Mammo screening bilateral w 3d and cad; Future    Asymptomatic postmenopausal state    Orders:    DXA bone density spine hip and pelvis; Future    DXA bone density spine hip and pelvis; Future    Acute URI         Colon cancer screening    Orders:    Cologuard       Preventive health issues were discussed with patient, and age appropriate screening tests were ordered as noted in patient's After Visit Summary. Personalized health advice and appropriate referrals for health education or preventive services given if needed, as noted in patient's After Visit Summary.    History of Present Illness     Cough       Patient Care Team:  Hardy Larose DO as PCP - General  Pablo Churchill MD (Gastroenterology)    Review of Systems   Respiratory:  Positive for cough.      Medical History Reviewed by provider this encounter:  Tobacco  Allergies  Meds  Problems  Med Hx  Surg Hx  Fam Hx       Annual Wellness Visit Questionnaire   Rosemary is here for her Subsequent Wellness visit.     Health Risk Assessment:   Patient rates overall health as very good. Patient feels that their physical health rating is same. Patient is satisfied with their life. Eyesight was rated as same. Hearing was rated as same. Patient feels that their emotional and mental health rating is same. Patients states they are never, rarely angry. Patient states they are never, rarely unusually tired/fatigued. Pain experienced in  the last 7 days has been some. Patient's pain rating has been 4/10. Patient states that she has experienced no weight loss or gain in last 6 months.     Depression Screening:   PHQ-2 Score: 0      Fall Risk Screening:   In the past year, patient has experienced: no history of falling in past year      Urinary Incontinence Screening:   Patient has not leaked urine accidently in the last six months.     Home Safety:  Patient does not have trouble with stairs inside or outside of their home. Patient has working smoke alarms and has working carbon monoxide detector. Home safety hazards include: none.     Nutrition:   Current diet is Limited junk food.     Medications:   Patient is not currently taking any over-the-counter supplements. Patient is able to manage medications.     Activities of Daily Living (ADLs)/Instrumental Activities of Daily Living (IADLs):   Walk and transfer into and out of bed and chair?: Yes  Dress and groom yourself?: Yes    Bathe or shower yourself?: Yes    Feed yourself? Yes  Do your laundry/housekeeping?: Yes  Manage your money, pay your bills and track your expenses?: Yes  Make your own meals?: Yes    Do your own shopping?: Yes    Previous Hospitalizations:   Any hospitalizations or ED visits within the last 12 months?: No      Advance Care Planning:   Living will: Yes    Durable POA for healthcare: Yes    Advanced directive: Yes      Cognitive Screening:   Provider or family/friend/caregiver concerned regarding cognition?: No    PREVENTIVE SCREENINGS      Cardiovascular Screening:    General: Screening Not Indicated, History Lipid Disorder, Screening Current and Risks and Benefits Discussed    Due for: Lipid Panel      Diabetes Screening:     General: Risks and Benefits Discussed and Screening Current    Due for: Blood Glucose      Colorectal Cancer Screening:     General: Risks and Benefits Discussed    Due for: Cologuard      Breast Cancer Screening:     General: Screening Current and Risks  "and Benefits Discussed    Due for: Mammogram        Cervical Cancer Screening:    General: Screening Not Indicated and Risks and Benefits Discussed      Osteoporosis Screening:    General: Risks and Benefits Discussed    Due for: DXA Axial      Abdominal Aortic Aneurysm (AAA) Screening:        General: Risks and Benefits Discussed and Screening Not Indicated      Lung Cancer Screening:     General: Screening Not Indicated and Risks and Benefits Discussed      Hepatitis C Screening:    General: Screening Current and Risks and Benefits Discussed    Other Counseling Topics:   Regular weightbearing exercise.     Social Determinants of Health     Food Insecurity: No Food Insecurity (10/14/2024)    Hunger Vital Sign     Worried About Running Out of Food in the Last Year: Never true     Ran Out of Food in the Last Year: Never true   Transportation Needs: No Transportation Needs (10/14/2024)    PRAPARE - Transportation     Lack of Transportation (Medical): No     Lack of Transportation (Non-Medical): No   Housing Stability: Low Risk  (10/14/2024)    Housing Stability Vital Sign     Unable to Pay for Housing in the Last Year: No     Number of Times Moved in the Last Year: 1     Homeless in the Last Year: No   Utilities: Not At Risk (10/14/2024)    [facility] Utilities     Threatened with loss of utilities: No     No results found.    Objective     /76 (BP Location: Left arm, Patient Position: Sitting, Cuff Size: Large)   Pulse 80   Temp 98.7 °F (37.1 °C) (Temporal)   Ht 5' 7\" (1.702 m)   Wt 85.4 kg (188 lb 3.2 oz)   SpO2 96%   BMI 29.48 kg/m²     Physical Exam    "

## 2024-10-14 NOTE — PATIENT INSTRUCTIONS
Medicare Preventive Visit Patient Instructions  Thank you for completing your Welcome to Medicare Visit or Medicare Annual Wellness Visit today. Your next wellness visit will be due in one year (10/15/2025).  The screening/preventive services that you may require over the next 5-10 years are detailed below. Some tests may not apply to you based off risk factors and/or age. Screening tests ordered at today's visit but not completed yet may show as past due. Also, please note that scanned in results may not display below.  Preventive Screenings:  Service Recommendations Previous Testing/Comments   Colorectal Cancer Screening  * Colonoscopy    * Fecal Occult Blood Test (FOBT)/Fecal Immunochemical Test (FIT)  * Fecal DNA/Cologuard Test  * Flexible Sigmoidoscopy Age: 45-75 years old   Colonoscopy: every 10 years (may be performed more frequently if at higher risk)  OR  FOBT/FIT: every 1 year  OR  Cologuard: every 3 years  OR  Sigmoidoscopy: every 5 years  Screening may be recommended earlier than age 45 if at higher risk for colorectal cancer. Also, an individualized decision between you and your healthcare provider will decide whether screening between the ages of 76-85 would be appropriate. Colonoscopy: Not on file  FOBT/FIT: Not on file  Cologuard: Not on file  Sigmoidoscopy: Not on file          Breast Cancer Screening Age: 40+ years old  Frequency: every 1-2 years  Not required if history of left and right mastectomy Mammogram: 01/10/2023    Screening Current   Cervical Cancer Screening Between the ages of 21-29, pap smear recommended once every 3 years.   Between the ages of 30-65, can perform pap smear with HPV co-testing every 5 years.   Recommendations may differ for women with a history of total hysterectomy, cervical cancer, or abnormal pap smears in past. Pap Smear: Not on file    Screening Not Indicated   Hepatitis C Screening Once for adults born between 1945 and 1965  More frequently in patients at high  risk for Hepatitis C Hep C Antibody: 11/15/2018    Screening Current   Diabetes Screening 1-2 times per year if you're at risk for diabetes or have pre-diabetes Fasting glucose: 96 mg/dL (2/24/2022)  A1C: 5.7 % (11/3/2020)      Cholesterol Screening Once every 5 years if you don't have a lipid disorder. May order more often based on risk factors. Lipid panel: 02/24/2022    Screening Not Indicated  History Lipid Disorder     Other Preventive Screenings Covered by Medicare:  Abdominal Aortic Aneurysm (AAA) Screening: covered once if your at risk. You're considered to be at risk if you have a family history of AAA.  Lung Cancer Screening: covers low dose CT scan once per year if you meet all of the following conditions: (1) Age 55-77; (2) No signs or symptoms of lung cancer; (3) Current smoker or have quit smoking within the last 15 years; (4) You have a tobacco smoking history of at least 20 pack years (packs per day multiplied by number of years you smoked); (5) You get a written order from a healthcare provider.  Glaucoma Screening: covered annually if you're considered high risk: (1) You have diabetes OR (2) Family history of glaucoma OR (3)  aged 50 and older OR (4)  American aged 65 and older  Osteoporosis Screening: covered every 2 years if you meet one of the following conditions: (1) You're estrogen deficient and at risk for osteoporosis based off medical history and other findings; (2) Have a vertebral abnormality; (3) On glucocorticoid therapy for more than 3 months; (4) Have primary hyperparathyroidism; (5) On osteoporosis medications and need to assess response to drug therapy.   Last bone density test (DXA Scan): Not on file.  HIV Screening: covered annually if you're between the age of 15-65. Also covered annually if you are younger than 15 and older than 65 with risk factors for HIV infection. For pregnant patients, it is covered up to 3 times per  pregnancy.    Immunizations:  Immunization Recommendations   Influenza Vaccine Annual influenza vaccination during flu season is recommended for all persons aged >= 6 months who do not have contraindications   Pneumococcal Vaccine   * Pneumococcal conjugate vaccine = PCV13 (Prevnar 13), PCV15 (Vaxneuvance), PCV20 (Prevnar 20)  * Pneumococcal polysaccharide vaccine = PPSV23 (Pneumovax) Adults 19-65 yo with certain risk factors or if 65+ yo  If never received any pneumonia vaccine: recommend Prevnar 20 (PCV20)  Give PCV20 if previously received 1 dose of PCV13 or PPSV23   Hepatitis B Vaccine 3 dose series if at intermediate or high risk (ex: diabetes, end stage renal disease, liver disease)   Respiratory syncytial virus (RSV) Vaccine - COVERED BY MEDICARE PART D  * RSVPreF3 (Arexvy) CDC recommends that adults 60 years of age and older may receive a single dose of RSV vaccine using shared clinical decision-making (SCDM)   Tetanus (Td) Vaccine - COST NOT COVERED BY MEDICARE PART B Following completion of primary series, a booster dose should be given every 10 years to maintain immunity against tetanus. Td may also be given as tetanus wound prophylaxis.   Tdap Vaccine - COST NOT COVERED BY MEDICARE PART B Recommended at least once for all adults. For pregnant patients, recommended with each pregnancy.   Shingles Vaccine (Shingrix) - COST NOT COVERED BY MEDICARE PART B  2 shot series recommended in those 19 years and older who have or will have weakened immune systems or those 50 years and older     Health Maintenance Due:      Topic Date Due   • Colorectal Cancer Screening  Never done   • Breast Cancer Screening: Mammogram  01/10/2025   • Hepatitis C Screening  Completed     Immunizations Due:      Topic Date Due   • Influenza Vaccine (1) 09/01/2024   • COVID-19 Vaccine (3 - 2023-24 season) 09/01/2024     Advance Directives   What are advance directives?  Advance directives are legal documents that state your wishes and  plans for medical care. These plans are made ahead of time in case you lose your ability to make decisions for yourself. Advance directives can apply to any medical decision, such as the treatments you want, and if you want to donate organs.   What are the types of advance directives?  There are many types of advance directives, and each state has rules about how to use them. You may choose a combination of any of the following:  Living will:  This is a written record of the treatment you want. You can also choose which treatments you do not want, which to limit, and which to stop at a certain time. This includes surgery, medicine, IV fluid, and tube feedings.   Durable power of  for healthcare (DPAHC):  This is a written record that states who you want to make healthcare choices for you when you are unable to make them for yourself. This person, called a proxy, is usually a family member or a friend. You may choose more than 1 proxy.  Do not resuscitate (DNR) order:  A DNR order is used in case your heart stops beating or you stop breathing. It is a request not to have certain forms of treatment, such as CPR. A DNR order may be included in other types of advance directives.  Medical directive:  This covers the care that you want if you are in a coma, near death, or unable to make decisions for yourself. You can list the treatments you want for each condition. Treatment may include pain medicine, surgery, blood transfusions, dialysis, IV or tube feedings, and a ventilator (breathing machine).  Values history:  This document has questions about your views, beliefs, and how you feel and think about life. This information can help others choose the care that you would choose.  Why are advance directives important?  An advance directive helps you control your care. Although spoken wishes may be used, it is better to have your wishes written down. Spoken wishes can be misunderstood, or not followed. Treatments  may be given even if you do not want them. An advance directive may make it easier for your family to make difficult choices about your care.   Weight Management   Why it is important to manage your weight:  Being overweight increases your risk of health conditions such as heart disease, high blood pressure, type 2 diabetes, and certain types of cancer. It can also increase your risk for osteoarthritis, sleep apnea, and other respiratory problems. Aim for a slow, steady weight loss. Even a small amount of weight loss can lower your risk of health problems.  How to lose weight safely:  A safe and healthy way to lose weight is to eat fewer calories and get regular exercise. You can lose up about 1 pound a week by decreasing the number of calories you eat by 500 calories each day.   Healthy meal plan for weight management:  A healthy meal plan includes a variety of foods, contains fewer calories, and helps you stay healthy. A healthy meal plan includes the following:  Eat whole-grain foods more often.  A healthy meal plan should contain fiber. Fiber is the part of grains, fruits, and vegetables that is not broken down by your body. Whole-grain foods are healthy and provide extra fiber in your diet. Some examples of whole-grain foods are whole-wheat breads and pastas, oatmeal, brown rice, and bulgur.  Eat a variety of vegetables every day.  Include dark, leafy greens such as spinach, kale, [place] greens, and mustard greens. Eat yellow and orange vegetables such as carrots, sweet potatoes, and winter squash.   Eat a variety of fruits every day.  Choose fresh or canned fruit (canned in its own juice or light syrup) instead of juice. Fruit juice has very little or no fiber.  Eat low-fat dairy foods.  Drink fat-free (skim) milk or 1% milk. Eat fat-free yogurt and low-fat cottage cheese. Try low-fat cheeses such as mozzarella and other reduced-fat cheeses.  Choose meat and other protein foods that are low in fat.  Choose  beans or other legumes such as split peas or lentils. Choose fish, skinless poultry (chicken or turkey), or lean cuts of red meat (beef or pork). Before you cook meat or poultry, cut off any visible fat.   Use less fat and oil.  Try baking foods instead of frying them. Add less fat, such as margarine, sour cream, regular salad dressing and mayonnaise to foods. Eat fewer high-fat foods. Some examples of high-fat foods include french fries, doughnuts, ice cream, and cakes.  Eat fewer sweets.  Limit foods and drinks that are high in sugar. This includes candy, cookies, regular soda, and sweetened drinks.  Exercise:  Exercise at least 30 minutes per day on most days of the week. Some examples of exercise include walking, biking, dancing, and swimming. You can also fit in more physical activity by taking the stairs instead of the elevator or parking farther away from stores. Ask your healthcare provider about the best exercise plan for you.      © Copyright [employer] 2018 Information is for End User's use only and may not be sold, redistributed or otherwise used for commercial purposes. All illustrations and images included in CareNotes® are the copyrighted property of A.D.A[employer]M., Inc[employer] or [employer]

## 2024-11-13 PROBLEM — J06.9 ACUTE URI: Status: RESOLVED | Noted: 2024-10-14 | Resolved: 2024-11-13

## 2024-12-02 ENCOUNTER — RESULTS FOLLOW-UP (OUTPATIENT)
Age: 69
End: 2024-12-02

## 2024-12-02 LAB — COLOGUARD RESULT REPORTABLE: NEGATIVE

## 2025-01-08 ENCOUNTER — APPOINTMENT (OUTPATIENT)
Dept: LAB | Age: 70
End: 2025-01-08
Payer: MEDICARE

## 2025-01-08 DIAGNOSIS — R41.3 MEMORY LOSS: ICD-10-CM

## 2025-01-08 DIAGNOSIS — Z13.1 SCREENING FOR DIABETES MELLITUS: ICD-10-CM

## 2025-01-08 DIAGNOSIS — E78.2 MIXED HYPERLIPIDEMIA: ICD-10-CM

## 2025-01-08 DIAGNOSIS — Z13.6 SCREENING FOR CARDIOVASCULAR CONDITION: ICD-10-CM

## 2025-01-08 DIAGNOSIS — R73.01 IMPAIRED FASTING GLUCOSE: ICD-10-CM

## 2025-01-08 LAB
ALBUMIN SERPL BCG-MCNC: 4.2 G/DL (ref 3.5–5)
ALP SERPL-CCNC: 84 U/L (ref 34–104)
ALT SERPL W P-5'-P-CCNC: 18 U/L (ref 7–52)
ANION GAP SERPL CALCULATED.3IONS-SCNC: 9 MMOL/L (ref 4–13)
AST SERPL W P-5'-P-CCNC: 17 U/L (ref 13–39)
BACTERIA UR QL AUTO: ABNORMAL /HPF
BASOPHILS # BLD AUTO: 0.06 THOUSANDS/ΜL (ref 0–0.1)
BASOPHILS NFR BLD AUTO: 1 % (ref 0–1)
BILIRUB SERPL-MCNC: 0.67 MG/DL (ref 0.2–1)
BILIRUB UR QL STRIP: NEGATIVE
BUN SERPL-MCNC: 17 MG/DL (ref 5–25)
CALCIUM SERPL-MCNC: 9.4 MG/DL (ref 8.4–10.2)
CHLORIDE SERPL-SCNC: 104 MMOL/L (ref 96–108)
CHOLEST SERPL-MCNC: 299 MG/DL (ref ?–200)
CLARITY UR: CLEAR
CO2 SERPL-SCNC: 29 MMOL/L (ref 21–32)
COLOR UR: ABNORMAL
CREAT SERPL-MCNC: 0.92 MG/DL (ref 0.6–1.3)
EOSINOPHIL # BLD AUTO: 0.12 THOUSAND/ΜL (ref 0–0.61)
EOSINOPHIL NFR BLD AUTO: 2 % (ref 0–6)
ERYTHROCYTE [DISTWIDTH] IN BLOOD BY AUTOMATED COUNT: 12.6 % (ref 11.6–15.1)
EST. AVERAGE GLUCOSE BLD GHB EST-MCNC: 114 MG/DL
GFR SERPL CREATININE-BSD FRML MDRD: 62 ML/MIN/1.73SQ M
GLUCOSE P FAST SERPL-MCNC: 78 MG/DL (ref 65–99)
GLUCOSE UR STRIP-MCNC: NEGATIVE MG/DL
HBA1C MFR BLD: 5.6 %
HCT VFR BLD AUTO: 43.8 % (ref 34.8–46.1)
HDLC SERPL-MCNC: 59 MG/DL
HGB BLD-MCNC: 14.8 G/DL (ref 11.5–15.4)
HGB UR QL STRIP.AUTO: NEGATIVE
IMM GRANULOCYTES # BLD AUTO: 0.01 THOUSAND/UL (ref 0–0.2)
IMM GRANULOCYTES NFR BLD AUTO: 0 % (ref 0–2)
KETONES UR STRIP-MCNC: NEGATIVE MG/DL
LDLC SERPL CALC-MCNC: 220 MG/DL (ref 0–100)
LEUKOCYTE ESTERASE UR QL STRIP: ABNORMAL
LYMPHOCYTES # BLD AUTO: 2.16 THOUSANDS/ΜL (ref 0.6–4.47)
LYMPHOCYTES NFR BLD AUTO: 33 % (ref 14–44)
MCH RBC QN AUTO: 30.3 PG (ref 26.8–34.3)
MCHC RBC AUTO-ENTMCNC: 33.8 G/DL (ref 31.4–37.4)
MCV RBC AUTO: 90 FL (ref 82–98)
MONOCYTES # BLD AUTO: 0.4 THOUSAND/ΜL (ref 0.17–1.22)
MONOCYTES NFR BLD AUTO: 6 % (ref 4–12)
NEUTROPHILS # BLD AUTO: 3.9 THOUSANDS/ΜL (ref 1.85–7.62)
NEUTS SEG NFR BLD AUTO: 58 % (ref 43–75)
NITRITE UR QL STRIP: NEGATIVE
NON-SQ EPI CELLS URNS QL MICRO: ABNORMAL /HPF
NONHDLC SERPL-MCNC: 240 MG/DL
NRBC BLD AUTO-RTO: 0 /100 WBCS
PH UR STRIP.AUTO: 7.5 [PH]
PLATELET # BLD AUTO: 224 THOUSANDS/UL (ref 149–390)
PMV BLD AUTO: 9.6 FL (ref 8.9–12.7)
POTASSIUM SERPL-SCNC: 4.1 MMOL/L (ref 3.5–5.3)
PROT SERPL-MCNC: 6.7 G/DL (ref 6.4–8.4)
PROT UR STRIP-MCNC: NEGATIVE MG/DL
RBC # BLD AUTO: 4.88 MILLION/UL (ref 3.81–5.12)
RBC #/AREA URNS AUTO: ABNORMAL /HPF
SODIUM SERPL-SCNC: 142 MMOL/L (ref 135–147)
SP GR UR STRIP.AUTO: 1.01 (ref 1–1.03)
TRIGL SERPL-MCNC: 102 MG/DL (ref ?–150)
TSH SERPL DL<=0.05 MIU/L-ACNC: 1.64 UIU/ML (ref 0.45–4.5)
UROBILINOGEN UR STRIP-ACNC: <2 MG/DL
VIT B12 SERPL-MCNC: 412 PG/ML (ref 180–914)
WBC # BLD AUTO: 6.65 THOUSAND/UL (ref 4.31–10.16)
WBC #/AREA URNS AUTO: ABNORMAL /HPF

## 2025-01-08 PROCEDURE — 80053 COMPREHEN METABOLIC PANEL: CPT

## 2025-01-08 PROCEDURE — 80061 LIPID PANEL: CPT

## 2025-01-08 PROCEDURE — 81001 URINALYSIS AUTO W/SCOPE: CPT

## 2025-01-08 PROCEDURE — 82607 VITAMIN B-12: CPT

## 2025-01-08 PROCEDURE — 85025 COMPLETE CBC W/AUTO DIFF WBC: CPT

## 2025-01-08 PROCEDURE — 83036 HEMOGLOBIN GLYCOSYLATED A1C: CPT

## 2025-01-08 PROCEDURE — 84443 ASSAY THYROID STIM HORMONE: CPT

## 2025-01-08 PROCEDURE — 36415 COLL VENOUS BLD VENIPUNCTURE: CPT

## 2025-01-12 ENCOUNTER — RESULTS FOLLOW-UP (OUTPATIENT)
Age: 70
End: 2025-01-12

## 2025-01-13 ENCOUNTER — OFFICE VISIT (OUTPATIENT)
Age: 70
End: 2025-01-13
Payer: MEDICARE

## 2025-01-13 VITALS
SYSTOLIC BLOOD PRESSURE: 128 MMHG | BODY MASS INDEX: 29.95 KG/M2 | OXYGEN SATURATION: 97 % | WEIGHT: 190.8 LBS | HEIGHT: 67 IN | TEMPERATURE: 97.9 F | DIASTOLIC BLOOD PRESSURE: 78 MMHG | HEART RATE: 88 BPM

## 2025-01-13 DIAGNOSIS — E78.2 MIXED HYPERLIPIDEMIA: Primary | ICD-10-CM

## 2025-01-13 DIAGNOSIS — E66.3 OVERWEIGHT (BMI 25.0-29.9): ICD-10-CM

## 2025-01-13 DIAGNOSIS — M79.602 BILATERAL ARM PAIN: ICD-10-CM

## 2025-01-13 DIAGNOSIS — K21.9 GERD WITHOUT ESOPHAGITIS: ICD-10-CM

## 2025-01-13 DIAGNOSIS — R73.01 IMPAIRED FASTING GLUCOSE: ICD-10-CM

## 2025-01-13 DIAGNOSIS — M79.601 BILATERAL ARM PAIN: ICD-10-CM

## 2025-01-13 DIAGNOSIS — M54.12 CERVICAL RADICULOPATHY: ICD-10-CM

## 2025-01-13 DIAGNOSIS — N18.30 STAGE 3 CHRONIC KIDNEY DISEASE, UNSPECIFIED WHETHER STAGE 3A OR 3B CKD (HCC): ICD-10-CM

## 2025-01-13 PROCEDURE — 99214 OFFICE O/P EST MOD 30 MIN: CPT | Performed by: FAMILY MEDICINE

## 2025-01-13 NOTE — PROGRESS NOTES
"Assessment/Plan:       Diagnoses and all orders for this visit:    Mixed hyperlipidemia  Comments:  Labs discussed with patient.  Dietary changes.  Follow-up with cardiology.    GERD without esophagitis  Comments:  Stable at this time.    Impaired fasting glucose  Comments:  Stable at this time.  A1c 5.6.  Continue with dietary changes    Bilateral arm pain    Cervical radiculopathy  Comments:  Patient go for x-ray.  Orders:  -     XR spine cervical complete 4 or 5 vw non injury; Future    Stage 3 chronic kidney disease, unspecified whether stage 3a or 3b CKD (HCC)    Overweight (BMI 25.0-29.9)            Subjective:        Patient ID: Rosemary Barroso is a 69 y.o. female.      Patient is here to follow-up on hyperlipidemia as well as GERD.  Patient with pain in the arms bilaterally.  Some pain bilateral arms stemming from neck.  No chest pain or shortness of breath associated with this.  Patient has had Botox in the past.  No significant weakness or numbness upper extremities.    Hyperlipidemia    Arm Pain           The following portions of the patient's history were reviewed and updated as appropriate: allergies, current medications, past family history, past medical history, past social history, past surgical history and problem list.      Review of Systems   Constitutional: Negative.    HENT: Negative.     Eyes: Negative.    Respiratory: Negative.     Cardiovascular: Negative.    Gastrointestinal: Negative.    Endocrine: Negative.    Genitourinary: Negative.    Musculoskeletal:  Positive for arthralgias.   Skin: Negative.    Allergic/Immunologic: Negative.    Neurological: Negative.    Hematological: Negative.    Psychiatric/Behavioral: Negative.             Objective:               /78 (BP Location: Left arm, Patient Position: Sitting, Cuff Size: Standard)   Pulse 88   Temp 97.9 °F (36.6 °C) (Probe)   Ht 5' 7\" (1.702 m)   Wt 86.5 kg (190 lb 12.8 oz)   SpO2 97%   BMI 29.88 kg/m²          Physical " Exam  Vitals and nursing note reviewed.   Constitutional:       General: She is not in acute distress.     Appearance: Normal appearance. She is not ill-appearing, toxic-appearing or diaphoretic.   HENT:      Head: Normocephalic and atraumatic.      Right Ear: Tympanic membrane, ear canal and external ear normal. There is no impacted cerumen.      Left Ear: Tympanic membrane, ear canal and external ear normal. There is no impacted cerumen.      Nose: Nose normal. No congestion or rhinorrhea.      Mouth/Throat:      Mouth: Mucous membranes are moist.      Pharynx: No oropharyngeal exudate or posterior oropharyngeal erythema.   Eyes:      General: No scleral icterus.        Right eye: No discharge.         Left eye: No discharge.   Neck:      Vascular: No carotid bruit.   Cardiovascular:      Rate and Rhythm: Normal rate and regular rhythm.      Pulses: Normal pulses.      Heart sounds: Normal heart sounds. No murmur heard.     No friction rub. No gallop.   Pulmonary:      Effort: Pulmonary effort is normal. No respiratory distress.      Breath sounds: Normal breath sounds. No stridor. No wheezing, rhonchi or rales.   Chest:      Chest wall: No tenderness.   Musculoskeletal:         General: Tenderness present. No swelling, deformity or signs of injury.      Cervical back: Normal range of motion and neck supple. No rigidity. No muscular tenderness.      Right lower leg: No edema.      Left lower leg: No edema.   Lymphadenopathy:      Cervical: No cervical adenopathy.   Skin:     General: Skin is warm and dry.      Capillary Refill: Capillary refill takes less than 2 seconds.      Coloration: Skin is not jaundiced.      Findings: No bruising, erythema, lesion or rash.   Neurological:      Mental Status: She is alert and oriented to person, place, and time.      Gait: Gait normal.   Psychiatric:         Mood and Affect: Mood normal.         Behavior: Behavior normal.         Thought Content: Thought content normal.          Judgment: Judgment normal.

## 2025-01-14 ENCOUNTER — RESULTS FOLLOW-UP (OUTPATIENT)
Age: 70
End: 2025-01-14

## 2025-01-14 ENCOUNTER — APPOINTMENT (OUTPATIENT)
Dept: RADIOLOGY | Age: 70
End: 2025-01-14
Payer: MEDICARE

## 2025-01-14 DIAGNOSIS — M54.12 CERVICAL RADICULOPATHY: ICD-10-CM

## 2025-01-14 DIAGNOSIS — M54.12 CERVICAL RADICULOPATHY: Primary | ICD-10-CM

## 2025-01-14 DIAGNOSIS — F41.9 ANXIETY: ICD-10-CM

## 2025-01-14 PROCEDURE — 72050 X-RAY EXAM NECK SPINE 4/5VWS: CPT

## 2025-02-05 NOTE — RESULT ENCOUNTER NOTE
Sign script for valium, patient was advise that the MRI was ordered, she thought it would be done under sedation and I stated no she would take this medication 2 hour prior, so she would like to try and schedule the MRI as open MRI, I gave her [facility] number of [phone] and she would like the MRI script mailed to her.

## 2025-02-05 NOTE — TELEPHONE ENCOUNTER
Patient called, request X ray results, and MRI suggestions. Conveyed Dr. Hardy Teran regarding x ray results. Patient states she is still in pain, request suggestions regarding mentioned MRI and sedative needed to complete MRI. Patient request a callback with an update. Please advise Patient at [phone], if any further questions.

## 2025-02-09 RX ORDER — DIAZEPAM 5 MG/1
TABLET ORAL
Qty: 1 TABLET | Refills: 0 | Status: SHIPPED | OUTPATIENT
Start: 2025-02-09

## 2025-06-30 ENCOUNTER — OFFICE VISIT (OUTPATIENT)
Age: 70
End: 2025-06-30
Payer: MEDICARE

## 2025-06-30 VITALS
OXYGEN SATURATION: 98 % | BODY MASS INDEX: 29.51 KG/M2 | TEMPERATURE: 97.7 F | HEIGHT: 67 IN | WEIGHT: 188 LBS | DIASTOLIC BLOOD PRESSURE: 82 MMHG | SYSTOLIC BLOOD PRESSURE: 138 MMHG | HEART RATE: 90 BPM

## 2025-06-30 DIAGNOSIS — R20.2 PARESTHESIAS: Primary | ICD-10-CM

## 2025-06-30 DIAGNOSIS — R73.01 IMPAIRED FASTING GLUCOSE: ICD-10-CM

## 2025-06-30 DIAGNOSIS — R20.0 NUMBNESS OF RIGHT FOOT: ICD-10-CM

## 2025-06-30 DIAGNOSIS — M54.12 CERVICAL RADICULOPATHY: ICD-10-CM

## 2025-06-30 DIAGNOSIS — E78.2 MIXED HYPERLIPIDEMIA: ICD-10-CM

## 2025-06-30 PROCEDURE — 99214 OFFICE O/P EST MOD 30 MIN: CPT | Performed by: FAMILY MEDICINE

## 2025-06-30 PROCEDURE — G2211 COMPLEX E/M VISIT ADD ON: HCPCS | Performed by: FAMILY MEDICINE

## 2025-06-30 NOTE — ASSESSMENT & PLAN NOTE
Patient will continue with multivitamin.  Patient will start magnesium.  To consider neurologic referral  Orders:    Ambulatory Referral to Neurology; Future

## 2025-06-30 NOTE — PROGRESS NOTES
"Name: Rosemary Barroso      : 1955      MRN: [id number]  Encounter Provider: Shoaib Larose DO  Encounter Date: 2025   Encounter department: [facility] PRIMARY CARE  :  Assessment & Plan  Paresthesias  Patient will continue with multivitamin.  Patient will start magnesium.  To consider neurologic referral  Orders:    Ambulatory Referral to Neurology; Future    Mixed hyperlipidemia  Continue with dietary measures.       Impaired fasting glucose  Continue with dietary modification.       Cervical radiculopathy         Numbness of right foot    Orders:    Ambulatory Referral to Neurology; Future           History of Present Illness   Patient is here to follow-up on chronic issues.  Patient still with some upper extremity pain bilateral arms.  Patient now with some numbness of the right foot.  No chest pain or shortness of breath or problems with urination or defecation associated with this.  Patient not on medications.    Hyperlipidemia  Associated symptoms include myalgias.     Review of Systems   Constitutional: Negative.    HENT: Negative.     Eyes: Negative.    Respiratory: Negative.     Cardiovascular: Negative.    Gastrointestinal: Negative.    Endocrine: Negative.    Genitourinary: Negative.    Musculoskeletal:  Positive for arthralgias and myalgias.   Skin: Negative.    Allergic/Immunologic: Negative.    Neurological:  Positive for numbness.   Hematological: Negative.    Psychiatric/Behavioral: Negative.         Objective   /82 (BP Location: Right arm, Patient Position: Sitting, Cuff Size: Standard)   Pulse 90   Temp 97.7 °F (36.5 °C) (Temporal)   Ht 5' 7\" (1.702 m)   Wt 85.3 kg (188 lb)   SpO2 98%   BMI 29.44 kg/m²      Physical Exam    "